# Patient Record
Sex: FEMALE | Race: BLACK OR AFRICAN AMERICAN | NOT HISPANIC OR LATINO | URBAN - METROPOLITAN AREA
[De-identification: names, ages, dates, MRNs, and addresses within clinical notes are randomized per-mention and may not be internally consistent; named-entity substitution may affect disease eponyms.]

---

## 2017-11-15 ENCOUNTER — CLINICAL SUPPORT (OUTPATIENT)
Dept: OTHER | Facility: CLINIC | Age: 32
End: 2017-11-15
Payer: COMMERCIAL

## 2017-11-15 VITALS
HEIGHT: 68 IN | DIASTOLIC BLOOD PRESSURE: 99 MMHG | WEIGHT: 262.63 LBS | BODY MASS INDEX: 39.8 KG/M2 | SYSTOLIC BLOOD PRESSURE: 160 MMHG

## 2017-11-15 DIAGNOSIS — Z00.8 HEALTH EXAMINATION IN POPULATION SURVEYS: Primary | ICD-10-CM

## 2017-11-15 LAB
GLUCOSE SERPL-MCNC: NORMAL MG/DL (ref 60–140)
POC CHOLESTEROL, HDL: 65 MG/DL (ref 40–?)
POC CHOLESTEROL, LDL: 95 MG/DL (ref ?–160)
POC CHOLESTEROL, TOTAL: 179 MG/DL (ref ?–240)
POC GLUCOSE FASTING: 92 MG/DL (ref 60–110)
POC TOTAL CHOLESTEROL / HDL RATIO: 2.75 (ref ?–6)
POC TRIGLYCERIDES: 91 MG/DL (ref ?–160)

## 2017-11-15 PROCEDURE — 80061 LIPID PANEL: CPT | Mod: QW,S$GLB,, | Performed by: INTERNAL MEDICINE

## 2017-11-15 PROCEDURE — 99401 PREV MED CNSL INDIV APPRX 15: CPT | Mod: S$GLB,,, | Performed by: INTERNAL MEDICINE

## 2017-11-15 PROCEDURE — 82947 ASSAY GLUCOSE BLOOD QUANT: CPT | Mod: QW,S$GLB,, | Performed by: INTERNAL MEDICINE

## 2017-11-16 ENCOUNTER — OFFICE VISIT (OUTPATIENT)
Dept: INTERNAL MEDICINE | Facility: CLINIC | Age: 32
End: 2017-11-16
Payer: COMMERCIAL

## 2017-11-16 VITALS
HEIGHT: 68 IN | BODY MASS INDEX: 40.05 KG/M2 | HEART RATE: 72 BPM | DIASTOLIC BLOOD PRESSURE: 120 MMHG | RESPIRATION RATE: 20 BRPM | TEMPERATURE: 98 F | SYSTOLIC BLOOD PRESSURE: 156 MMHG | WEIGHT: 264.25 LBS

## 2017-11-16 DIAGNOSIS — E66.01 MORBID OBESITY: ICD-10-CM

## 2017-11-16 DIAGNOSIS — I10 HYPERTENSION, UNSPECIFIED TYPE: Primary | ICD-10-CM

## 2017-11-16 LAB
BILIRUB SERPL-MCNC: NORMAL MG/DL
BLOOD URINE, POC: NORMAL
COLOR, POC UA: YELLOW
GLUCOSE UR QL STRIP: NORMAL
KETONES UR QL STRIP: NORMAL
LEUKOCYTE ESTERASE URINE, POC: NORMAL
NITRITE, POC UA: NORMAL
PH, POC UA: 7
PROTEIN, POC: NORMAL
SPECIFIC GRAVITY, POC UA: 1.01
UROBILINOGEN, POC UA: 0.2

## 2017-11-16 PROCEDURE — 81002 URINALYSIS NONAUTO W/O SCOPE: CPT | Mod: S$GLB,,, | Performed by: INTERNAL MEDICINE

## 2017-11-16 PROCEDURE — 99999 PR PBB SHADOW E&M-EST. PATIENT-LVL III: CPT | Mod: PBBFAC,,, | Performed by: INTERNAL MEDICINE

## 2017-11-16 PROCEDURE — 93010 ELECTROCARDIOGRAM REPORT: CPT | Mod: S$GLB,,, | Performed by: INTERNAL MEDICINE

## 2017-11-16 PROCEDURE — 99203 OFFICE O/P NEW LOW 30 MIN: CPT | Mod: 25,S$GLB,, | Performed by: INTERNAL MEDICINE

## 2017-11-16 PROCEDURE — 93005 ELECTROCARDIOGRAM TRACING: CPT | Mod: S$GLB,,, | Performed by: INTERNAL MEDICINE

## 2017-11-16 RX ORDER — VALSARTAN AND HYDROCHLOROTHIAZIDE 320; 25 MG/1; MG/1
1 TABLET, FILM COATED ORAL DAILY
Qty: 90 TABLET | Refills: 3 | Status: SHIPPED | OUTPATIENT
Start: 2017-11-16 | End: 2018-08-23

## 2017-11-16 RX ORDER — ACETAMINOPHEN 500 MG
1 TABLET ORAL DAILY
Qty: 1 EACH | Refills: 0 | Status: SHIPPED | OUTPATIENT
Start: 2017-11-16

## 2017-11-16 NOTE — PATIENT INSTRUCTIONS
Taking Your Blood Pressure  Blood pressure is the force of blood against the artery wall as it moves from the heart through the blood vessels. You can take your own blood pressure reading using a digital monitor. Take your readings the same each time, using the same arm. Take readings as often as your healthcare provider instructs.  About blood pressure monitors  Blood pressure monitors are designed for certain ages and cases. You can find monitors for older adults, for pregnant women, and for children. Make sure the one you choose is the right one for your age and situation.  The American Heart Association recommends an automatic cuff monitor that fits on your upper arm (bicep). The cuff should fit your arm size. A cuff thats too large or too small will not give an accurate reading. Measure around your upper arm to find your size.  Monitors that attach to your finger or wrist are not as accurate as monitors for your upper arm.  Ask your healthcare provider for help in choosing a monitor. Bring your monitor to your next provider visit if you need help in using it the correct way.  The steps below are general instructions for using an automatic digital monitor.  Step 1. Relax    · Take your blood pressure at the same time every day, such as in the morning or evening, or at the time your healthcare provider recommends.  · Wait at least a half-hour after smoking, eating, or exercising. Don't drink coffee, tea, soda, or other caffeinated beverages before checking your blood pressure.  · Sit comfortably at a table with both feet on the floor. Do not cross your legs or feet. Place the monitor near you.  · Rest for a few minutes before you begin.  Step 2. Wrap the cuff    · Place your arm on the table, palm up. Your arm should be at the level of your heart. Wrap the cuff around your upper arm, just above your elbow. Its best done on bare skin, not over clothing. Most cuffs will indicate where the brachial artery (the  blood vessel in the middle of the arm at the inner side of the elbow) should line up with the cuff. Look in your monitor's instruction booklet for an illustration. You can also bring your cuff to your healthcare provider and have them show you how to correctly place the cuff.  Step 3. Inflate the cuff    · Push the button that starts the pump.  · The cuff will tighten, then loosen.  · The numbers will change. When they stop changing, your blood pressure reading will appear.  · Take 2 or 3 readings one minute apart.  Step 4. Write down the results of each reading    · Write down your blood pressure numbers for each reading. Note the date and time. Keep your results in one place, such as a notebook. Even if your monitor has a built-in memory, keep a hard copy of the readings.  · Remove the cuff from your arm. Turn off the machine.  · Bring your blood pressure records with your healthcare providers at each visit.  · If you start a new blood pressure medicine, note the day you started the new medicine. Also note the day if you change the dose of your medicine. This information goes on your blood pressure recording sheet. This will help your healthcare provider monitor how well the medicine changes are working.  · Ask your healthcare provider what numbers should prompt you to call him or her. Also ask what numbers should prompt you to get help right away.  Date Last Reviewed: 11/1/2016 © 2000-2017 [employer]. [address] [place] [address]. All rights reserved. This information is not intended as a substitute for professional medical care. Always follow your healthcare professional's instructions.        Exercise for a Healthier Heart  You may wonder how you can improve the health of your heart. If youre thinking about exercise, youre on the right track. You dont need to become an athlete, but you do need a certain amount of brisk exercise to help strengthen your heart. If you have been  diagnosed with a heart condition, your doctor may recommend exercise to help stabilize your condition. To help make exercise a habit, choose safe, fun activities.     Exercise with a friend. When activity is fun, you're more likely to stick with it.     Be sure to check with your healthcare provider before starting an exercise program.   Why exercise?  Exercising regularly offers many healthy rewards. It can help you do all of the following:  · Improve your blood cholesterol level to help prevent further heart trouble  · Lower your blood pressure to help prevent a stroke or heart attack  · Control diabetes, or reduce your risk of getting this disease  · Improve your heart and lung function  · Reach and maintain a healthy weight  · Make your muscles stronger and more limber so you can stay active  · Prevent falls and fractures by slowing the loss of bone mass (osteoporosis)  · Manage stress better  · Reduce your blood pressure  · Improve your sense of self and your body image  Exercise tips  Ease into your routine. Set small goals. Then build on them.  Exercise on most days. Aim for a total of 150 or more minutes of moderate to  vigorous intensity activity each week. Consider 40 minutes, 3 to 4 times a week. For best results, activity should last for 40 minutes on average. It is OK to work up to the 40 minute period over time. Examples of moderate-intensity activity is walking 1 mile in 15 minutes or 30 to 45 minutes of yard work.  Step up your daily activity level. Along with your exercise program, try being more active throughout the day. Walk instead of drive. Do more household tasks or yard work.  Choose one or more activities you enjoy. Walking is one of the easiest things you can do. You can also try swimming, riding a bike, dancing, or taking an exercise class.  Stop exercising and call your doctor if you:  · Have chest pain or feel dizzy or lightheaded  · Feel burning, tightness, pressure, or heaviness in  your chest, neck, shoulders, back, or arms  · Have unusual shortness of breath  · Have increased joint or muscle pain  · Have palpitations or an irregular heartbeat   Date Last Reviewed: 5/1/2016  © 3127-9440 [employer]. [address] [place] [address], [place] [address]. All rights reserved. This information is not intended as a substitute for professional medical care. Always follow your healthcare professional's instructions.        Low-Salt Diet  This diet removes foods that are high in salt. It also limits the amount of salt you use when cooking. It is most often used for people with high blood pressure, edema (fluid retention), and kidney, liver, or heart disease.  Table salt contains the mineral sodium. Your body needs sodium to work normally. But too much sodium can make your health problems worse. Your healthcare provider is recommending a low-salt (also called low-sodium) diet for you. Your total daily allowance of salt is 1,500 to 2,300 milligrams (mg). It is less than 1 teaspoon of table salt. This means you can have only about 500 to 700 mg of sodium at each meal. People with certain health problems should limit salt intake to the lower end of the recommended range.    When you cook, dont add much salt. If you can cook without using salt, even better. Dont add salt to your food at the table.  When shopping, read food labels. Salt is often called sodium on the label. Choose foods that are salt-free, low salt, or very low salt. Note that foods with reduced salt may not lower your salt intake enough.    Beans, potatoes, and pasta  Ok: Dry beans, split peas, lentils, potatoes, rice, macaroni, pasta, spaghetti without added salt  Avoid: Potato chips, tortilla chips, and similar products  Breads and cereals  Ok: Low-sodium breads, rolls, cereals, and cakes; low-salt crackers, matzo crackers  Avoid: Salted crackers, pretzels, popcorn, Hungarian toast, pancakes, muffins  Dairy  Ok: Milk, chocolate milk,  hot chocolate mix, low-salt cheeses, and yogurt  Avoid: Processed cheese and cheese spreads; Roquefort, Camembert, and cottage cheese; buttermilk, instant breakfast drink  Desserts  Ok: Ice cream, frozen yogurt, juice bars, gelatin, cookies and pies, sugar, honey, jelly, hard candy  Avoid: Most pies, cakes and cookies prepared or processed with salt; instant pudding  Drinks  Ok: Tea, coffee, fizzy (carbonated) drinks, juices  Avoid: Flavored coffees, electrolyte replacement drinks, sports drinks  Meats  Ok: All fresh meat, fish, poultry, low-salt tuna, eggs, egg substitute  Avoid: Smoked, pickled, brine-cured, or salted meats and fish. This includes andrews, chipped beef, corned beef, hot dogs, deli meats, ham, kosher meats, salt pork, sausage, canned tuna, salted codfish, smoked salmon, herring, sardines, or anchovies.  Seasonings and spices  Ok: Most seasonings are okay. Good substitutes for salt include: fresh herb blends, hot sauce, lemon, garlic, hawthorne, vinegar, dry mustard, parsley, cilantro, horseradish, tomato paste, regular margarine, mayonnaise, unsalted butter, cream cheese, vegetable oil, cream, low-salt salad dressing and gravy.  Avoid: Regular ketchup, relishes, pickles, soy sauce, teriyaki sauce, Worcestershire sauce, BBQ sauce, tartar sauce, meat tenderizer, chili sauce, regular gravy, regular salad dressing, salted butter  Soups  Ok: Low-salt soups and broths made with allowed foods  Avoid: Bouillon cubes, soups with smoked or salted meats, regular soup and broth  Vegetables  Ok: Most vegetables are okay; also low-salt tomato and vegetable juices  Avoid: Sauerkraut and other brine-soaked vegetables; pickles and other pickled vegetables; tomato juice, olives  Date Last Reviewed: 8/1/2016 © 2000-2017 [employer]. [address] [place] [address]. All rights reserved. This information is not intended as a substitute for professional medical care. Always follow your healthcare  professional's instructions.

## 2017-11-16 NOTE — PROGRESS NOTES
Subjective:       Patient ID: Samantha Boston is a 32 y.o. female.    Chief Complaint: Establish Care; Hypertension (x 1year); and Headache    HPI  Pt establishing care.  Pt with know HTN x 1 year, found to be 160/100 yesterday at a health fair.  Pt with long-term HA.  No visual problems.  No CP, SOB.  Admits to a high Na diet.  Exercises 3 days/week.  Pt with a normal sugar and a favorable cholesterol panel yesterday.  Review of Systems   All other systems reviewed and are negative.      Objective:      Physical Exam   Constitutional: She appears well-developed. No distress.   obese   HENT:   Head: Normocephalic.   Eyes: EOM are normal.   Neck: Normal range of motion. No tracheal deviation present.   Cardiovascular: Normal rate, regular rhythm and intact distal pulses.  Exam reveals gallop. Friction rub: S4.    Pulmonary/Chest: Effort normal and breath sounds normal. No respiratory distress.   Abdominal: Soft. Bowel sounds are normal. She exhibits no distension. There is no tenderness.   Musculoskeletal: Normal range of motion. She exhibits no edema.   Neurological: She is alert. No cranial nerve deficit. She exhibits normal muscle tone. Coordination normal.   Skin: Skin is warm and dry. No rash noted. She is not diaphoretic. No erythema.   Psychiatric: She has a normal mood and affect. Her behavior is normal.   Vitals reviewed.      Assessment:       1. Hypertension, unspecified type    2. Morbid obesity        Plan:       Samantha was seen today for establish care, hypertension and headache.    Diagnoses and all orders for this visit:    Hypertension, unspecified type  -     POCT urine dipstick without microscope  -     EKG 12-lead; Future  -     blood pressure test kit-large Kit; 1 Units by Misc.(Non-Drug; Combo Route) route once daily.  -     valsartan-hydrochlorothiazide (DIOVAN-HCT) 320-25 mg per tablet; Take 1 tablet by mouth once daily.    Morbid obesity      Return in about 2 weeks (around 11/30/2017).

## 2017-11-20 ENCOUNTER — TELEPHONE (OUTPATIENT)
Dept: INTERNAL MEDICINE | Facility: CLINIC | Age: 32
End: 2017-11-20

## 2017-11-20 ENCOUNTER — PATIENT MESSAGE (OUTPATIENT)
Dept: INTERNAL MEDICINE | Facility: CLINIC | Age: 32
End: 2017-11-20

## 2017-11-20 DIAGNOSIS — Z00.00 ROUTINE GENERAL MEDICAL EXAMINATION AT A HEALTH CARE FACILITY: Primary | ICD-10-CM

## 2017-11-20 NOTE — TELEPHONE ENCOUNTER
----- Message from Cele Phillips sent at 11/17/2017 12:21 PM CST -----   Patient said she had a rough night couldn't sleep because her back and leg was hurting and she was also feeling dizzy. Would like to get blood work orders due to her symptoms.

## 2017-11-29 NOTE — PROGRESS NOTES
Reviewed results of biometric screening per RODRIGO Lin RN. Would like to obtain PCP. Asymptomatic. Pt states she has been running high BP. Instructed to go to ER if symptomatic. Pt verbalized understanding.

## 2018-01-19 ENCOUNTER — TELEPHONE (OUTPATIENT)
Dept: OTHER | Facility: CLINIC | Age: 33
End: 2018-01-19

## 2018-08-14 ENCOUNTER — TELEPHONE (OUTPATIENT)
Dept: OPTOMETRY | Facility: CLINIC | Age: 33
End: 2018-08-14

## 2018-08-14 NOTE — TELEPHONE ENCOUNTER
----- Message from Meeta Adair sent at 8/14/2018  8:48 AM CDT -----  Contact: Samantha Boston   Pt would like to speak with  nurse to rescheduled the appointment please something sooner ,pt cn be reached at [phone] please thank you.

## 2018-08-23 ENCOUNTER — OFFICE VISIT (OUTPATIENT)
Dept: OPTOMETRY | Facility: CLINIC | Age: 33
End: 2018-08-23
Payer: COMMERCIAL

## 2018-08-23 DIAGNOSIS — Z46.0 FITTING AND ADJUSTMENT OF SPECTACLES AND CONTACT LENSES: Primary | ICD-10-CM

## 2018-08-23 DIAGNOSIS — H52.13 MYOPIA OF BOTH EYES: Primary | ICD-10-CM

## 2018-08-23 PROCEDURE — 92015 DETERMINE REFRACTIVE STATE: CPT | Mod: S$GLB,,, | Performed by: OPTOMETRIST

## 2018-08-23 PROCEDURE — 92310 CONTACT LENS FITTING OU: CPT | Mod: ,,, | Performed by: OPTOMETRIST

## 2018-08-23 PROCEDURE — 92004 COMPRE OPH EXAM NEW PT 1/>: CPT | Mod: S$GLB,,, | Performed by: OPTOMETRIST

## 2018-08-23 PROCEDURE — 99999 PR PBB SHADOW E&M-EST. PATIENT-LVL I: CPT | Mod: PBBFAC,,, | Performed by: OPTOMETRIST

## 2018-08-23 PROCEDURE — 99999 PR PBB SHADOW E&M-EST. PATIENT-LVL II: CPT | Mod: PBBFAC,,, | Performed by: OPTOMETRIST

## 2018-08-23 RX ORDER — NORGESTIMATE AND ETHINYL ESTRADIOL 0.25-0.035
KIT ORAL
COMMUNITY

## 2018-08-23 RX ORDER — AMLODIPINE BESYLATE 5 MG/1
TABLET ORAL
COMMUNITY
Start: 2018-07-17

## 2018-08-23 RX ORDER — OLMESARTAN MEDOXOMIL AND HYDROCHLOROTHIAZIDE 20/12.5 20; 12.5 MG/1; MG/1
TABLET ORAL
COMMUNITY
Start: 2018-08-16

## 2018-08-23 NOTE — PROGRESS NOTES
HPI     GINO: 1 year ago  Pt states glasses are too strong when on the computer, does not wear   glasses when on the computer. Has not worn CL in 2 years. Wore OASYS in   the past  Denies f/f    No gtts     Wear WILMA SCL       Last edited by Shola Bell, OD on 8/23/2018  3:02 PM. (History)        ROS     Negative for: Constitutional, Gastrointestinal, Neurological, Skin,   Genitourinary, Musculoskeletal, HENT, Endocrine, Cardiovascular, Eyes,   Respiratory, Psychiatric, Allergic/Imm, Heme/Lymph    Last edited by Shola Bell, OD on 8/23/2018  2:38 PM. (History)        Assessment /Plan     For exam results, see Encounter Report.    Myopia of both eyes      1. Good fit/VA w OASYS CLs (slight reduced VA OS 2 to uncorrected cyl)  2. Wrote new spex Rx.  Pt works all day on computer, and discussed it might be best to not wear spex or Cls at work (since slightly nearsighted)  3. Discussed CRIZAL for glare    PLAN:    1. Wrote spex/CLRx  2. Continue Daily Wear schedule.  NO SLEEPING IN CONTACT LENSES. Clean and disinfect nightly.  exchange monthly.    3. Advised REFRESH PLUS ATs BID+ since on computer most of the day  4. rtc 1 yr

## 2018-11-07 ENCOUNTER — CLINICAL SUPPORT (OUTPATIENT)
Dept: OTHER | Facility: CLINIC | Age: 33
End: 2018-11-07
Payer: COMMERCIAL

## 2018-11-07 DIAGNOSIS — Z00.8 ENCOUNTER FOR OTHER GENERAL EXAMINATION: ICD-10-CM

## 2018-11-07 PROCEDURE — 80061 LIPID PANEL: CPT | Mod: QW,S$GLB,, | Performed by: INTERNAL MEDICINE

## 2018-11-07 PROCEDURE — 82947 ASSAY GLUCOSE BLOOD QUANT: CPT | Mod: QW,S$GLB,, | Performed by: INTERNAL MEDICINE

## 2018-11-07 PROCEDURE — 99401 PREV MED CNSL INDIV APPRX 15: CPT | Mod: S$GLB,,, | Performed by: INTERNAL MEDICINE

## 2018-11-08 VITALS — HEIGHT: 67 IN | BODY MASS INDEX: 42.01 KG/M2

## 2018-11-08 LAB
HDLC SERPL-MCNC: 61 MG/DL
POC CHOLESTEROL, LDL (DOCK): 113 MG/DL
POC CHOLESTEROL, TOTAL: 187 MG/DL
POC GLUCOSE, FASTING: 97 MG/DL
TRIGL SERPL-MCNC: 68 MG/DL

## 2019-08-05 ENCOUNTER — PROCEDURE VISIT (OUTPATIENT)
Dept: OPHTHALMOLOGY | Facility: CLINIC | Age: 34
End: 2019-08-05
Payer: COMMERCIAL

## 2019-08-05 DIAGNOSIS — H00.11 CHALAZION OF RIGHT UPPER EYELID: Primary | ICD-10-CM

## 2019-08-05 DIAGNOSIS — H52.7 REFRACTIVE ERROR: ICD-10-CM

## 2019-08-05 PROCEDURE — 92012 INTRM OPH EXAM EST PATIENT: CPT | Mod: S$GLB,,, | Performed by: OPHTHALMOLOGY

## 2019-08-05 PROCEDURE — 92012 PR EYE EXAM, EST PATIENT,INTERMED: ICD-10-PCS | Mod: S$GLB,,, | Performed by: OPHTHALMOLOGY

## 2019-08-05 RX ORDER — NIFEDIPINE 30 MG/1
TABLET, EXTENDED RELEASE ORAL
COMMUNITY
Start: 2019-07-18

## 2019-08-05 NOTE — PROGRESS NOTES
Subjective:       Patient ID: Samantha Boston is a 34 y.o. female.    Chief Complaint: Chalazion    HPI     DSL- 8/23/18 Dr. Bell    35 y/o female is here for Chalazion removal. Pt states Chalazion has   decreased since last visit. Pt is 29 weeks pregnant. Pt have not tried any   treatment expect for warm compress. Pt denies VA change.     Eyemeds  No gtts    Last edited by Antonio Dykes on 8/5/2019  3:52 PM. (History)             Assessment:       1. Chalazion of right upper eyelid    2. Refractive error        Plan:       RUL chalazion-Pt wants to try med tx.  RE-Doing well.      Start WC's & Maxitrol shauna bid to RUL x 10-14 days.  RTC me prn for excision.

## 2020-10-05 ENCOUNTER — PATIENT MESSAGE (OUTPATIENT)
Dept: INTERNAL MEDICINE | Facility: CLINIC | Age: 35
End: 2020-10-05

## 2021-04-14 ENCOUNTER — OFFICE VISIT (OUTPATIENT)
Dept: OPTOMETRY | Facility: CLINIC | Age: 36
End: 2021-04-14
Payer: MEDICAID

## 2021-04-14 ENCOUNTER — TELEPHONE (OUTPATIENT)
Dept: OPTOMETRY | Facility: CLINIC | Age: 36
End: 2021-04-14

## 2021-04-14 DIAGNOSIS — H52.13 MYOPIA OF BOTH EYES: Primary | ICD-10-CM

## 2021-04-14 DIAGNOSIS — Z46.0 FITTING AND ADJUSTMENT OF SPECTACLES AND CONTACT LENSES: Primary | ICD-10-CM

## 2021-04-14 DIAGNOSIS — Z97.3 WEARS CONTACT LENSES: ICD-10-CM

## 2021-04-14 PROCEDURE — 92015 DETERMINE REFRACTIVE STATE: CPT | Mod: ,,, | Performed by: OPTOMETRIST

## 2021-04-14 PROCEDURE — 99999 PR PBB SHADOW E&M-EST. PATIENT-LVL II: ICD-10-PCS | Mod: PBBFAC,,, | Performed by: OPTOMETRIST

## 2021-04-14 PROCEDURE — 99999 PR PBB SHADOW E&M-EST. PATIENT-LVL II: CPT | Mod: PBBFAC,,, | Performed by: OPTOMETRIST

## 2021-04-14 PROCEDURE — 92015 PR REFRACTION: ICD-10-PCS | Mod: ,,, | Performed by: OPTOMETRIST

## 2021-04-14 PROCEDURE — 92014 COMPRE OPH EXAM EST PT 1/>: CPT | Mod: S$PBB,,, | Performed by: OPTOMETRIST

## 2021-04-14 PROCEDURE — 92014 PR EYE EXAM, EST PATIENT,COMPREHESV: ICD-10-PCS | Mod: S$PBB,,, | Performed by: OPTOMETRIST

## 2021-04-14 PROCEDURE — 99212 OFFICE O/P EST SF 10 MIN: CPT | Mod: PBBFAC,PO | Performed by: OPTOMETRIST

## 2021-04-14 PROCEDURE — 92310 CONTACT LENS FITTING OU: CPT | Mod: ,,, | Performed by: OPTOMETRIST

## 2021-04-14 PROCEDURE — 92310 PR CONTACT LENS FITTING (NO CHANGE): ICD-10-PCS | Mod: ,,, | Performed by: OPTOMETRIST

## 2021-10-29 ENCOUNTER — PATIENT MESSAGE (OUTPATIENT)
Dept: OPTOMETRY | Facility: CLINIC | Age: 36
End: 2021-10-29
Payer: MEDICAID

## 2024-07-19 ENCOUNTER — HOSPITAL ENCOUNTER (EMERGENCY)
Facility: HOSPITAL | Age: 39
Discharge: HOME | End: 2024-07-19
Attending: STUDENT IN AN ORGANIZED HEALTH CARE EDUCATION/TRAINING PROGRAM
Payer: MEDICAID

## 2024-07-19 VITALS
OXYGEN SATURATION: 99 % | BODY MASS INDEX: 39.4 KG/M2 | SYSTOLIC BLOOD PRESSURE: 144 MMHG | HEART RATE: 69 BPM | WEIGHT: 260 LBS | HEIGHT: 68 IN | DIASTOLIC BLOOD PRESSURE: 87 MMHG | RESPIRATION RATE: 19 BRPM | TEMPERATURE: 98 F

## 2024-07-19 DIAGNOSIS — M54.32 SCIATICA OF LEFT SIDE: Primary | ICD-10-CM

## 2024-07-19 LAB
B-HCG UR QL: NEGATIVE
BILIRUB UR QL STRIP: NEGATIVE
CLARITY UR REFRACT.AUTO: CLEAR
COLOR UR AUTO: YELLOW
CTP QC/QA: YES
GLUCOSE UR QL STRIP: NEGATIVE
HGB UR QL STRIP: NEGATIVE
KETONES UR QL STRIP: ABNORMAL
LEUKOCYTE ESTERASE UR QL STRIP: NEGATIVE
NITRITE UR QL STRIP: NEGATIVE
PH UR STRIP: 7 [PH] (ref 5–8)
PROT UR QL STRIP: NEGATIVE
SP GR UR STRIP: 1.01 (ref 1–1.03)
URN SPEC COLLECT METH UR: ABNORMAL
UROBILINOGEN UR STRIP-ACNC: NEGATIVE EU/DL

## 2024-07-19 PROCEDURE — 81003 URINALYSIS AUTO W/O SCOPE: CPT | Mod: ER | Performed by: STUDENT IN AN ORGANIZED HEALTH CARE EDUCATION/TRAINING PROGRAM

## 2024-07-19 PROCEDURE — 81025 URINE PREGNANCY TEST: CPT | Mod: ER | Performed by: STUDENT IN AN ORGANIZED HEALTH CARE EDUCATION/TRAINING PROGRAM

## 2024-07-19 PROCEDURE — 99283 EMERGENCY DEPT VISIT LOW MDM: CPT | Mod: ER

## 2024-07-19 RX ORDER — KETOROLAC TROMETHAMINE 10 MG/1
10 TABLET, FILM COATED ORAL EVERY 6 HOURS
Qty: 20 TABLET | Refills: 0 | Status: SHIPPED | OUTPATIENT
Start: 2024-07-19 | End: 2024-07-24

## 2024-07-19 NOTE — ED PROVIDER NOTES
NAME:  Samantha Boston  CSN:     [id number]  MRN:    [id number]  ADMIT DATE: 7/19/2024        eMERGENCY dEPARTMENT eNCOUnter    CHIEF COMPLAINT    Chief Complaint   Patient presents with    Flank Pain     Pt C/O L flank pain with radiation to vagina X 5 days. Pt denies GI/.        HPI    Samantha Boston is a 39 y.o. female with a past medical history of  has no past medical history on file.     she presents to the ED due to pain to the left back starting 5 days ago.  Worse with certain movements.  States the 1st day she could barely change position secondary to pain.  Noted that 1st day she had pain with bowel movement and had to strain.  Notes bowel movements have been normal since that time.  Has been stretching feeling as if it is getting better.  States she would feel intermittent popping in the left hip, concerned she could have dislocated it or having some issue causing the pain.  Denies any urinary symptoms.  No vaginal discharge, however now pain is radiating into the vagina and feels as if something could be falling out almost.  Notes a pressure-like sensation.  Normal bowel movements.  Has been taking Tylenol without initially but does not seem to be working as well.  Has never had pain like this in the past.  No known injury.    HPI       PAST MEDICAL HISTORY  No past medical history on file.    SURGICAL HISTORY    Past Surgical History:   Procedure Laterality Date    TONSILLECTOMY         FAMILY HISTORY    Family History   Problem Relation Name Age of Onset    Cataracts Maternal Grandmother      Glaucoma Maternal Grandmother      Hypertension Mother      Amblyopia Neg Hx      Blindness Neg Hx      Cancer Neg Hx      Macular degeneration Neg Hx      Retinal detachment Neg Hx      Strabismus Neg Hx         SOCIAL HISTORY    Social History     Socioeconomic History    Marital status: Single   Tobacco Use    Smoking status: Never    Smokeless tobacco: Never   Substance and Sexual Activity    Alcohol use:  No    Drug use: No    Sexual activity: Yes     Social Determinants of Health     Financial Resource Strain: Low Risk  (6/24/2021)    Received from [facility]    Overall Financial Resource Strain (CARDIA)     Difficulty of Paying Living Expenses: Not hard at all   Food Insecurity: No Food Insecurity (6/24/2021)    Received from [facility]    Hunger Vital Sign     Worried About Running Out of Food in the Last Year: Never true     Ran Out of Food in the Last Year: Never true   Transportation Needs: No Transportation Needs (6/24/2021)    Received from [facility]    PRAPARE - Transportation     Lack of Transportation (Medical): No     Lack of Transportation (Non-Medical): No   Physical Activity: Inactive (6/8/2021)    Received from [facility]    Exercise Vital Sign     Days of Exercise per Week: 0 days     Minutes of Exercise per Session: 0 min   Stress: Stress Concern Present (6/8/2021)    Received from [facility]    [employer] - Occupational Stress Questionnaire     Feeling of Stress : To some extent       MEDICATIONS  Current Outpatient Medications   Medication Instructions    amLODIPine (NORVASC) 5 MG tablet No dose, route, or frequency recorded.    blood pressure test kit-large Kit 1 Units, Misc.(Non-Drug; Combo Route), Daily    ketorolac (TORADOL) 10 mg, Oral, Every 6 hours    NIFEdipine (PROCARDIA-XL) 30 MG (OSM) 24 hr tablet No dose, route, or frequency recorded.    norgestimate-ethinyl estradiol (MONONESSA, 28,) 0.25-35 mg-mcg per tablet Mononessa (28) 0.25 mg-35 mcg tablet    olmesartan-hydrochlorothiazide (BENICAR HCT) 20-12.5 mg per tablet No dose, route, or frequency recorded.       ALLERGIES    Review of patient's allergies indicates:  No Known Allergies      REVIEW OF SYSTEMS   Review of Systems       PHYSICAL EXAM    Reviewed Triage Note    VITAL SIGNS:   ED Triage Vitals [07/19/24 1601]   Enc Vitals Group      BP (!) 144/87      Pulse 69      Resp 19      Temp 97.9 °F  "(36.6 °C)      Temp Source Oral      SpO2 99 %      Weight 260 lb      Height 5' 8"      Head Circumference       Peak Flow       Pain Score       Pain Loc       Pain Education       Exclude from Growth Chart        Patient Vitals for the past 24 hrs:   BP Temp Temp src Pulse Resp SpO2 Height Weight   07/19/24 1601 (!) 144/87 97.9 °F (36.6 °C) Oral 69 19 99 % 5' 8" (1.727 m) 117.9 kg (260 lb)       Physical Exam    Nursing note and vitals reviewed.  Constitutional: She appears well-developed and well-nourished.   HENT:   Head: Normocephalic and atraumatic.   Eyes: EOM are normal. Pupils are equal, round, and reactive to light.   Neck: Neck supple.   Normal range of motion.  Cardiovascular:  Normal rate and regular rhythm.           Pulmonary/Chest: Breath sounds normal. No respiratory distress.   Abdominal: Abdomen is soft. There is no abdominal tenderness. Hernia confirmed negative in the right inguinal area and confirmed negative in the left inguinal area.   Genitourinary:    Vagina normal.   Cervix exhibits no motion tenderness, no discharge and no friability.    No vaginal discharge.      Genitourinary Comments: No evidence of prolapse.  No in the vaginal canal appreciated.     Musculoskeletal:         General: Normal range of motion.      Cervical back: Normal range of motion and neck supple.      Comments: Reproducible tenderness to palpation to the left lower lumbar spine.     Neurological: She is alert and oriented to person, place, and time.   Skin: Skin is warm and dry.   Psychiatric: She has a normal mood and affect.          EKG     Interpreted by EM physician if performed:               LABS  Pertinent labs reviewed. (See chart for details)   Labs Reviewed   URINALYSIS, REFLEX TO URINE CULTURE - Abnormal       Result Value    Specimen UA Urine, Clean Catch      Color, UA Yellow      Appearance, UA Clear      pH, UA 7.0      Specific Gravity, UA 1.015      Protein, UA Negative      Glucose, UA Negative   "    Ketones, UA 1+ (*)     Bilirubin (UA) Negative      Occult Blood UA Negative      Nitrite, UA Negative      Urobilinogen, UA Negative      Leukocytes, UA Negative      Narrative:     Preferred Collection Type->Urine, Clean Catch  Specimen Source->Urine   POCT URINE PREGNANCY    POC Preg Test, Ur Negative       Acceptable Yes           RADIOLOGY          Imaging Results    None           PROCEDURES    Procedures      ED COURSE & MEDICAL DECISION MAKING    Pertinent Labs & Imaging studies reviewed. (See chart for details and specific orders.)          Summary of review of records:   Last seen by primary care in June of 2024.  Routine screening at that time.    Medical Decision Making  Amount and/or Complexity of Data Reviewed  Labs: ordered. Decision-making details documented in ED Course.      Samantha Boston is a 39 y.o. female presents with 5 days of left lower back pain worse with certain movements now with radiation into the groin region.  No known injury.    Differential includes but is not limited to UTI, pregnancy constipation, prolapse, considered but doubt endometriosis or intra-abdominal.          Medications - No data to display    ED Course as of 07/19/24 1759   Fri Jul 19, 2024   1631 Urinalysis, Reflex to Urine Culture Urine, Clean Catch(!)  No e/o infection [HL]   1631 hCG Qualitative, Urine: Negative [HL]      ED Course User Index  [HL] Betty Vega,        No acute emergent medical condition has been identified. The patient appears to be low risk for an emergent medical condition is appropriate for discharge with outpatient f/u as detailed in discharge instructions for reevaluation and possible continued outpatient workup and management. I have discussed the workup with the patient, who has verbalized understanding of the plan and need for outpatient follow-up.  This evaluation does not preclude the development of an emergent condition so in addition, I have advised the  patient that they can return to the ED at any time with worsening or change of their symptoms, or with any other medical complaint.         FINAL IMPRESSION    Final diagnoses:  [M54.32] Sciatica of left side (Primary)       DISPOSITION  Patient discharged in stable condition        ED Prescriptions       Medication Sig Dispense Start Date End Date Auth. Provider    ketorolac (TORADOL) 10 mg tablet Take 1 tablet (10 mg total) by mouth every 6 (six) hours. for 5 days 20 tablet 7/19/2024 7/24/2024 Betty Vega DO          Follow-up Information       Follow up With Specialties Details Why Contact Info    Sandor Funez MD Internal Medicine Schedule an appointment as soon as possible for a visit in 1 week  [address] [place] [address] [address]  [facility] [id number] [phone]      [facility] - Emergency Dept Emergency Medicine  If symptoms worsen, As needed [address] [facility]  Emergency Department  [facility] [address] [phone]              DISCLAIMER: This note was prepared with M*modal voice recognition transcription software. Garbled syntax, mangled pronouns, and other bizarre constructions may be attributed to that software system.             Betty Vega DO  07/19/24 [id number]

## 2024-07-19 NOTE — DISCHARGE INSTRUCTIONS
These medications are available over the counter to help with musculoskeletal pain:   You can take tylenol 500 mg every 6 hours.   You can use over the counter Salonpas LIDOCAINE 4% Pain Relieving Gel-Patch or something similar that contains lidocaine.

## 2025-06-18 ENCOUNTER — HOSPITAL ENCOUNTER (INPATIENT)
Facility: HOSPITAL | Age: 40
LOS: 3 days | Discharge: HOME | DRG: 769 | End: 2025-06-22
Attending: STUDENT IN AN ORGANIZED HEALTH CARE EDUCATION/TRAINING PROGRAM | Admitting: HOSPITALIST
Payer: COMMERCIAL

## 2025-06-18 DIAGNOSIS — R00.0 TACHYCARDIA: ICD-10-CM

## 2025-06-18 DIAGNOSIS — Z13.6 SCREENING FOR CARDIOVASCULAR CONDITION: ICD-10-CM

## 2025-06-18 DIAGNOSIS — N39.0 URINARY TRACT INFECTION WITHOUT HEMATURIA, SITE UNSPECIFIED: Primary | ICD-10-CM

## 2025-06-18 DIAGNOSIS — R07.9 CHEST PAIN: ICD-10-CM

## 2025-06-18 DIAGNOSIS — A41.9 SEPSIS, DUE TO UNSPECIFIED ORGANISM, UNSPECIFIED WHETHER ACUTE ORGAN DYSFUNCTION PRESENT: ICD-10-CM

## 2025-06-18 PROCEDURE — 96365 THER/PROPH/DIAG IV INF INIT: CPT | Mod: 59,ER

## 2025-06-18 PROCEDURE — 96361 HYDRATE IV INFUSION ADD-ON: CPT | Mod: ER

## 2025-06-18 PROCEDURE — 96366 THER/PROPH/DIAG IV INF ADDON: CPT | Mod: ER

## 2025-06-18 PROCEDURE — 99285 EMERGENCY DEPT VISIT HI MDM: CPT | Mod: ER,25

## 2025-06-19 ENCOUNTER — ANESTHESIA EVENT (OUTPATIENT)
Dept: SURGERY | Facility: HOSPITAL | Age: 40
End: 2025-06-19
Payer: COMMERCIAL

## 2025-06-19 ENCOUNTER — ANESTHESIA (OUTPATIENT)
Dept: SURGERY | Facility: HOSPITAL | Age: 40
End: 2025-06-19
Payer: COMMERCIAL

## 2025-06-19 PROBLEM — D62 ACUTE BLOOD LOSS ANEMIA: Status: ACTIVE | Noted: 2025-06-19

## 2025-06-19 PROBLEM — E87.6 HYPOKALEMIA: Status: ACTIVE | Noted: 2025-06-19

## 2025-06-19 PROBLEM — E83.42 HYPOMAGNESEMIA: Status: ACTIVE | Noted: 2025-06-19

## 2025-06-19 PROBLEM — A41.9 SEPSIS: Status: ACTIVE | Noted: 2025-06-19

## 2025-06-19 PROBLEM — O14.93 PRE-ECLAMPSIA IN THIRD TRIMESTER: Status: ACTIVE | Noted: 2025-06-19

## 2025-06-19 LAB
ABSOLUTE EOSINOPHIL (OHS): 0 K/UL
ABSOLUTE EOSINOPHIL (OHS): 0 K/UL
ABSOLUTE MONOCYTE (OHS): 0.04 K/UL (ref 0.3–1)
ABSOLUTE MONOCYTE (OHS): 0.3 K/UL (ref 0.3–1)
ABSOLUTE NEUTROPHIL COUNT (OHS): 4.24 K/UL (ref 1.8–7.7)
ABSOLUTE NEUTROPHIL COUNT (OHS): 9.85 K/UL (ref 1.8–7.7)
ALBUMIN SERPL BCP-MCNC: 4.1 G/DL (ref 3.5–5.2)
ALP SERPL-CCNC: 144 UNIT/L (ref 38–126)
ALT SERPL W/O P-5'-P-CCNC: 26 UNIT/L (ref 10–44)
ANION GAP (OHS): 12 MMOL/L (ref 8–16)
ANION GAP (OHS): 15 MMOL/L (ref 8–16)
AST SERPL-CCNC: 22 UNIT/L (ref 15–46)
BACTERIA #/AREA URNS AUTO: ABNORMAL /HPF
BASOPHILS # BLD AUTO: 0.01 K/UL
BASOPHILS # BLD AUTO: 0.03 K/UL
BASOPHILS NFR BLD AUTO: 0.2 %
BASOPHILS NFR BLD AUTO: 0.3 %
BILIRUB SERPL-MCNC: 0.4 MG/DL (ref 0.1–1)
BILIRUB UR QL STRIP.AUTO: NEGATIVE
BILIRUB UR QL STRIP.AUTO: NEGATIVE
BUN SERPL-MCNC: 12 MG/DL (ref 6–20)
BUN SERPL-MCNC: 15 MG/DL (ref 7–17)
CALCIUM SERPL-MCNC: 8 MG/DL (ref 8.7–10.5)
CALCIUM SERPL-MCNC: 8.8 MG/DL (ref 8.7–10.5)
CHLORIDE SERPL-SCNC: 106 MMOL/L (ref 95–110)
CHLORIDE SERPL-SCNC: 109 MMOL/L (ref 95–110)
CLARITY UR: CLEAR
CLARITY UR: CLEAR
CO2 SERPL-SCNC: 18 MMOL/L (ref 23–29)
CO2 SERPL-SCNC: 19 MMOL/L (ref 23–29)
COLOR UR AUTO: YELLOW
COLOR UR AUTO: YELLOW
CREAT SERPL-MCNC: 0.8 MG/DL (ref 0.5–1.4)
CREAT SERPL-MCNC: 0.9 MG/DL (ref 0.5–1.4)
ERYTHROCYTE [DISTWIDTH] IN BLOOD BY AUTOMATED COUNT: 14.4 % (ref 11.5–14.5)
ERYTHROCYTE [DISTWIDTH] IN BLOOD BY AUTOMATED COUNT: 15.1 % (ref 11.5–14.5)
GFR SERPLBLD CREATININE-BSD FMLA CKD-EPI: >60 ML/MIN/1.73/M2
GFR SERPLBLD CREATININE-BSD FMLA CKD-EPI: >60 ML/MIN/1.73/M2
GLUCOSE SERPL-MCNC: 214 MG/DL (ref 70–110)
GLUCOSE SERPL-MCNC: 94 MG/DL (ref 70–110)
GLUCOSE UR QL STRIP: NEGATIVE
GLUCOSE UR QL STRIP: NEGATIVE
HCG INTACT+B SERPL-ACNC: 14.08 MIU/ML
HCG INTACT+B SERPL-ACNC: 17.51 MIU/ML
HCT VFR BLD AUTO: 35 % (ref 37–48.5)
HCT VFR BLD AUTO: 38.8 % (ref 37–48.5)
HGB BLD-MCNC: 11.4 GM/DL (ref 12–16)
HGB BLD-MCNC: 12.8 GM/DL (ref 12–16)
HGB UR QL STRIP: ABNORMAL
HGB UR QL STRIP: ABNORMAL
HOLD SPECIMEN: NORMAL
HOLD SPECIMEN: NORMAL
IMM GRANULOCYTES # BLD AUTO: 0.05 K/UL (ref 0–0.04)
IMM GRANULOCYTES # BLD AUTO: 0.08 K/UL (ref 0–0.04)
IMM GRANULOCYTES NFR BLD AUTO: 0.5 % (ref 0–0.5)
IMM GRANULOCYTES NFR BLD AUTO: 1.6 % (ref 0–0.5)
INFLUENZA A MOLECULAR (OHS): NEGATIVE
INFLUENZA B MOLECULAR (OHS): NEGATIVE
KETONES UR QL STRIP: ABNORMAL
KETONES UR QL STRIP: NEGATIVE
LACTATE SERPL-SCNC: 0.9 MMOL/L (ref 0.5–2.2)
LACTATE SERPL-SCNC: 4.1 MMOL/L (ref 0.5–2.2)
LEUKOCYTE ESTERASE UR QL STRIP: ABNORMAL
LEUKOCYTE ESTERASE UR QL STRIP: ABNORMAL
LIPASE SERPL-CCNC: 94 U/L (ref 23–300)
LYMPHOCYTES # BLD AUTO: 0.22 K/UL (ref 1–4.8)
LYMPHOCYTES # BLD AUTO: 0.58 K/UL (ref 1–4.8)
MAGNESIUM SERPL-MCNC: 1.7 MG/DL (ref 1.6–2.6)
MAGNESIUM SERPL-MCNC: 1.8 MG/DL (ref 1.6–2.6)
MCH RBC QN AUTO: 27.8 PG (ref 27–31)
MCH RBC QN AUTO: 28.4 PG (ref 27–31)
MCHC RBC AUTO-ENTMCNC: 32.6 G/DL (ref 32–36)
MCHC RBC AUTO-ENTMCNC: 33 G/DL (ref 32–36)
MCV RBC AUTO: 85 FL (ref 82–98)
MCV RBC AUTO: 86 FL (ref 82–98)
MICROSCOPIC COMMENT: ABNORMAL
MICROSCOPIC COMMENT: NORMAL
NITRITE UR QL STRIP: NEGATIVE
NITRITE UR QL STRIP: NEGATIVE
NUCLEATED RBC (/100WBC) (OHS): 0 /100 WBC
NUCLEATED RBC (/100WBC) (OHS): 0 /100 WBC
OHS QRS DURATION: 68 MS
OHS QTC CALCULATION: 480 MS
PH UR STRIP: 6 [PH]
PH UR STRIP: 6 [PH]
PHOSPHATE SERPL-MCNC: 4.1 MG/DL (ref 2.7–4.5)
PLATELET # BLD AUTO: 274 K/UL (ref 150–450)
PLATELET # BLD AUTO: 311 K/UL (ref 150–450)
PMV BLD AUTO: 9.2 FL (ref 9.2–12.9)
PMV BLD AUTO: 9.3 FL (ref 9.2–12.9)
POTASSIUM SERPL-SCNC: 3.3 MMOL/L (ref 3.5–5.1)
POTASSIUM SERPL-SCNC: 4.1 MMOL/L (ref 3.5–5.1)
PROCALCITONIN SERPL-MCNC: 0.96 NG/ML
PROT SERPL-MCNC: 7.5 GM/DL (ref 6–8.4)
PROT UR QL STRIP: ABNORMAL
PROT UR QL STRIP: NEGATIVE
RBC # BLD AUTO: 4.1 M/UL (ref 4–5.4)
RBC # BLD AUTO: 4.5 M/UL (ref 4–5.4)
RBC #/AREA URNS AUTO: 10 /HPF (ref 0–4)
RBC #/AREA URNS HPF: 1 /HPF (ref 0–4)
RELATIVE EOSINOPHIL (OHS): 0 %
RELATIVE EOSINOPHIL (OHS): 0 %
RELATIVE LYMPHOCYTE (OHS): 11.7 % (ref 18–48)
RELATIVE LYMPHOCYTE (OHS): 2.1 % (ref 18–48)
RELATIVE MONOCYTE (OHS): 0.8 % (ref 4–15)
RELATIVE MONOCYTE (OHS): 2.9 % (ref 4–15)
RELATIVE NEUTROPHIL (OHS): 85.7 % (ref 38–73)
RELATIVE NEUTROPHIL (OHS): 94.2 % (ref 38–73)
SARS-COV-2 RDRP RESP QL NAA+PROBE: NEGATIVE
SODIUM SERPL-SCNC: 139 MMOL/L (ref 136–145)
SODIUM SERPL-SCNC: 140 MMOL/L (ref 136–145)
SP GR UR STRIP: <=1.005
SP GR UR STRIP: >=1.03
SQUAMOUS #/AREA URNS AUTO: 1 /HPF
TSH SERPL-ACNC: 2.58 UIU/ML (ref 0.4–4)
UROBILINOGEN UR STRIP-ACNC: NEGATIVE EU/DL
UROBILINOGEN UR STRIP-ACNC: NEGATIVE EU/DL
VANCOMYCIN TROUGH SERPL-MCNC: 9.8 UG/ML (ref 10–22)
WBC # BLD AUTO: 10.45 K/UL (ref 3.9–12.7)
WBC # BLD AUTO: 4.95 K/UL (ref 3.9–12.7)
WBC #/AREA URNS AUTO: 52 /HPF (ref 0–5)
WBC #/AREA URNS HPF: 5 /HPF (ref 0–5)
WBC CLUMPS UR QL AUTO: ABNORMAL

## 2025-06-19 PROCEDURE — 71000039 HC RECOVERY, EACH ADD'L HOUR: Performed by: OBSTETRICS & GYNECOLOGY

## 2025-06-19 PROCEDURE — 85025 COMPLETE CBC W/AUTO DIFF WBC: CPT | Mod: ER | Performed by: STUDENT IN AN ORGANIZED HEALTH CARE EDUCATION/TRAINING PROGRAM

## 2025-06-19 PROCEDURE — 51701 INSERT BLADDER CATHETER: CPT

## 2025-06-19 PROCEDURE — 87154 CUL TYP ID BLD PTHGN 6+ TRGT: CPT | Mod: ER | Performed by: STUDENT IN AN ORGANIZED HEALTH CARE EDUCATION/TRAINING PROGRAM

## 2025-06-19 PROCEDURE — 25000003 PHARM REV CODE 250: Performed by: HOSPITALIST

## 2025-06-19 PROCEDURE — 25000003 PHARM REV CODE 250: Performed by: OBSTETRICS & GYNECOLOGY

## 2025-06-19 PROCEDURE — 87186 SC STD MICRODIL/AGAR DIL: CPT | Performed by: OBSTETRICS & GYNECOLOGY

## 2025-06-19 PROCEDURE — 36000705 HC OR TIME LEV I EA ADD 15 MIN: Performed by: OBSTETRICS & GYNECOLOGY

## 2025-06-19 PROCEDURE — 84702 CHORIONIC GONADOTROPIN TEST: CPT | Mod: ER | Performed by: STUDENT IN AN ORGANIZED HEALTH CARE EDUCATION/TRAINING PROGRAM

## 2025-06-19 PROCEDURE — 87075 CULTR BACTERIA EXCEPT BLOOD: CPT | Performed by: OBSTETRICS & GYNECOLOGY

## 2025-06-19 PROCEDURE — 37000008 HC ANESTHESIA 1ST 15 MINUTES: Performed by: OBSTETRICS & GYNECOLOGY

## 2025-06-19 PROCEDURE — 80053 COMPREHEN METABOLIC PANEL: CPT | Mod: ER | Performed by: STUDENT IN AN ORGANIZED HEALTH CARE EDUCATION/TRAINING PROGRAM

## 2025-06-19 PROCEDURE — 37000009 HC ANESTHESIA EA ADD 15 MINS: Performed by: OBSTETRICS & GYNECOLOGY

## 2025-06-19 PROCEDURE — 63600175 PHARM REV CODE 636 W HCPCS: Performed by: STUDENT IN AN ORGANIZED HEALTH CARE EDUCATION/TRAINING PROGRAM

## 2025-06-19 PROCEDURE — 88305 TISSUE EXAM BY PATHOLOGIST: CPT | Mod: TC | Performed by: OBSTETRICS & GYNECOLOGY

## 2025-06-19 PROCEDURE — 99900035 HC TECH TIME PER 15 MIN (STAT): Mod: ER

## 2025-06-19 PROCEDURE — 81001 URINALYSIS AUTO W/SCOPE: CPT | Mod: ER | Performed by: STUDENT IN AN ORGANIZED HEALTH CARE EDUCATION/TRAINING PROGRAM

## 2025-06-19 PROCEDURE — 83735 ASSAY OF MAGNESIUM: CPT | Performed by: STUDENT IN AN ORGANIZED HEALTH CARE EDUCATION/TRAINING PROGRAM

## 2025-06-19 PROCEDURE — 63600175 PHARM REV CODE 636 W HCPCS: Performed by: HOSPITALIST

## 2025-06-19 PROCEDURE — 84702 CHORIONIC GONADOTROPIN TEST: CPT | Performed by: OBSTETRICS & GYNECOLOGY

## 2025-06-19 PROCEDURE — 88305 TISSUE EXAM BY PATHOLOGIST: CPT | Mod: 26,,, | Performed by: PATHOLOGY

## 2025-06-19 PROCEDURE — 25000003 PHARM REV CODE 250: Performed by: STUDENT IN AN ORGANIZED HEALTH CARE EDUCATION/TRAINING PROGRAM

## 2025-06-19 PROCEDURE — 84100 ASSAY OF PHOSPHORUS: CPT | Mod: ER | Performed by: STUDENT IN AN ORGANIZED HEALTH CARE EDUCATION/TRAINING PROGRAM

## 2025-06-19 PROCEDURE — 84443 ASSAY THYROID STIM HORMONE: CPT | Mod: ER | Performed by: STUDENT IN AN ORGANIZED HEALTH CARE EDUCATION/TRAINING PROGRAM

## 2025-06-19 PROCEDURE — 59160 D & C AFTER DELIVERY: CPT | Mod: ,,, | Performed by: OBSTETRICS & GYNECOLOGY

## 2025-06-19 PROCEDURE — 25500020 PHARM REV CODE 255: Mod: ER | Performed by: STUDENT IN AN ORGANIZED HEALTH CARE EDUCATION/TRAINING PROGRAM

## 2025-06-19 PROCEDURE — 3E0P7VZ INTRODUCTION OF HORMONE INTO FEMALE REPRODUCTIVE, VIA NATURAL OR ARTIFICIAL OPENING: ICD-10-PCS | Performed by: OBSTETRICS & GYNECOLOGY

## 2025-06-19 PROCEDURE — 36415 COLL VENOUS BLD VENIPUNCTURE: CPT | Performed by: STUDENT IN AN ORGANIZED HEALTH CARE EDUCATION/TRAINING PROGRAM

## 2025-06-19 PROCEDURE — 93010 ELECTROCARDIOGRAM REPORT: CPT | Mod: ,,, | Performed by: INTERNAL MEDICINE

## 2025-06-19 PROCEDURE — 36000704 HC OR TIME LEV I 1ST 15 MIN: Performed by: OBSTETRICS & GYNECOLOGY

## 2025-06-19 PROCEDURE — 93005 ELECTROCARDIOGRAM TRACING: CPT | Mod: ER

## 2025-06-19 PROCEDURE — 36415 COLL VENOUS BLD VENIPUNCTURE: CPT | Performed by: OBSTETRICS & GYNECOLOGY

## 2025-06-19 PROCEDURE — 80202 ASSAY OF VANCOMYCIN: CPT | Performed by: STUDENT IN AN ORGANIZED HEALTH CARE EDUCATION/TRAINING PROGRAM

## 2025-06-19 PROCEDURE — 87040 BLOOD CULTURE FOR BACTERIA: CPT | Mod: ER | Performed by: STUDENT IN AN ORGANIZED HEALTH CARE EDUCATION/TRAINING PROGRAM

## 2025-06-19 PROCEDURE — 10D17ZZ EXTRACTION OF PRODUCTS OF CONCEPTION, RETAINED, VIA NATURAL OR ARTIFICIAL OPENING: ICD-10-PCS | Performed by: OBSTETRICS & GYNECOLOGY

## 2025-06-19 PROCEDURE — 21400001 HC TELEMETRY ROOM

## 2025-06-19 PROCEDURE — 63600175 PHARM REV CODE 636 W HCPCS: Performed by: ANESTHESIOLOGY

## 2025-06-19 PROCEDURE — 81000 URINALYSIS NONAUTO W/SCOPE: CPT | Mod: ER | Performed by: STUDENT IN AN ORGANIZED HEALTH CARE EDUCATION/TRAINING PROGRAM

## 2025-06-19 PROCEDURE — 87086 URINE CULTURE/COLONY COUNT: CPT | Performed by: STUDENT IN AN ORGANIZED HEALTH CARE EDUCATION/TRAINING PROGRAM

## 2025-06-19 PROCEDURE — 87502 INFLUENZA DNA AMP PROBE: CPT | Mod: ER | Performed by: HOSPITALIST

## 2025-06-19 PROCEDURE — 71000033 HC RECOVERY, INTIAL HOUR: Performed by: OBSTETRICS & GYNECOLOGY

## 2025-06-19 PROCEDURE — 96365 THER/PROPH/DIAG IV INF INIT: CPT | Mod: ER

## 2025-06-19 PROCEDURE — 85025 COMPLETE CBC W/AUTO DIFF WBC: CPT | Performed by: STUDENT IN AN ORGANIZED HEALTH CARE EDUCATION/TRAINING PROGRAM

## 2025-06-19 PROCEDURE — 83690 ASSAY OF LIPASE: CPT | Mod: ER | Performed by: STUDENT IN AN ORGANIZED HEALTH CARE EDUCATION/TRAINING PROGRAM

## 2025-06-19 PROCEDURE — U0002 COVID-19 LAB TEST NON-CDC: HCPCS | Mod: ER | Performed by: HOSPITALIST

## 2025-06-19 PROCEDURE — 63600175 PHARM REV CODE 636 W HCPCS: Mod: ER | Performed by: STUDENT IN AN ORGANIZED HEALTH CARE EDUCATION/TRAINING PROGRAM

## 2025-06-19 PROCEDURE — 83605 ASSAY OF LACTIC ACID: CPT | Mod: ER | Performed by: STUDENT IN AN ORGANIZED HEALTH CARE EDUCATION/TRAINING PROGRAM

## 2025-06-19 PROCEDURE — 83735 ASSAY OF MAGNESIUM: CPT | Mod: ER | Performed by: STUDENT IN AN ORGANIZED HEALTH CARE EDUCATION/TRAINING PROGRAM

## 2025-06-19 PROCEDURE — 84145 PROCALCITONIN (PCT): CPT | Mod: ER | Performed by: STUDENT IN AN ORGANIZED HEALTH CARE EDUCATION/TRAINING PROGRAM

## 2025-06-19 PROCEDURE — 25000003 PHARM REV CODE 250: Mod: ER | Performed by: STUDENT IN AN ORGANIZED HEALTH CARE EDUCATION/TRAINING PROGRAM

## 2025-06-19 RX ORDER — DIPHENHYDRAMINE HYDROCHLORIDE 50 MG/ML
12.5 INJECTION, SOLUTION INTRAMUSCULAR; INTRAVENOUS EVERY 6 HOURS PRN
Status: DISCONTINUED | OUTPATIENT
Start: 2025-06-19 | End: 2025-06-22 | Stop reason: HOSPADM

## 2025-06-19 RX ORDER — PHENYLEPHRINE HYDROCHLORIDE 10 MG/ML
INJECTION INTRAVENOUS
Status: DISCONTINUED | OUTPATIENT
Start: 2025-06-19 | End: 2025-06-19

## 2025-06-19 RX ORDER — GLUCAGON 1 MG
1 KIT INJECTION
Status: DISCONTINUED | OUTPATIENT
Start: 2025-06-19 | End: 2025-06-19

## 2025-06-19 RX ORDER — IBUPROFEN 400 MG/1
400 TABLET, FILM COATED ORAL EVERY 6 HOURS PRN
Status: DISCONTINUED | OUTPATIENT
Start: 2025-06-19 | End: 2025-06-22 | Stop reason: HOSPADM

## 2025-06-19 RX ORDER — LIDOCAINE HYDROCHLORIDE 20 MG/ML
INJECTION INTRAVENOUS
Status: DISCONTINUED | OUTPATIENT
Start: 2025-06-19 | End: 2025-06-19

## 2025-06-19 RX ORDER — HEPARIN SODIUM 5000 [USP'U]/ML
5000 INJECTION, SOLUTION INTRAVENOUS; SUBCUTANEOUS EVERY 8 HOURS
Status: DISCONTINUED | OUTPATIENT
Start: 2025-06-19 | End: 2025-06-19

## 2025-06-19 RX ORDER — METHYLERGONOVINE MALEATE 0.2 MG/1
0.2 TABLET ORAL 4 TIMES DAILY
Status: DISCONTINUED | OUTPATIENT
Start: 2025-06-19 | End: 2025-06-19

## 2025-06-19 RX ORDER — METHYLERGONOVINE MALEATE 0.2 MG/1
0.2 TABLET ORAL
Status: DISCONTINUED | OUTPATIENT
Start: 2025-06-19 | End: 2025-06-20

## 2025-06-19 RX ORDER — GLUCAGON 1 MG
1 KIT INJECTION
Status: DISCONTINUED | OUTPATIENT
Start: 2025-06-19 | End: 2025-06-22 | Stop reason: HOSPADM

## 2025-06-19 RX ORDER — ACETAMINOPHEN 500 MG
1000 TABLET ORAL
Status: COMPLETED | OUTPATIENT
Start: 2025-06-19 | End: 2025-06-19

## 2025-06-19 RX ORDER — HYDROMORPHONE HYDROCHLORIDE 2 MG/ML
0.2 INJECTION, SOLUTION INTRAMUSCULAR; INTRAVENOUS; SUBCUTANEOUS EVERY 5 MIN PRN
Status: DISCONTINUED | OUTPATIENT
Start: 2025-06-19 | End: 2025-06-19

## 2025-06-19 RX ORDER — SODIUM CHLORIDE 0.9 % (FLUSH) 0.9 %
10 SYRINGE (ML) INJECTION EVERY 12 HOURS PRN
Status: DISCONTINUED | OUTPATIENT
Start: 2025-06-19 | End: 2025-06-22 | Stop reason: HOSPADM

## 2025-06-19 RX ORDER — PROCHLORPERAZINE EDISYLATE 5 MG/ML
5 INJECTION INTRAMUSCULAR; INTRAVENOUS EVERY 30 MIN PRN
Status: DISCONTINUED | OUTPATIENT
Start: 2025-06-19 | End: 2025-06-19

## 2025-06-19 RX ORDER — MIDAZOLAM HYDROCHLORIDE 1 MG/ML
INJECTION INTRAMUSCULAR; INTRAVENOUS
Status: DISCONTINUED | OUTPATIENT
Start: 2025-06-19 | End: 2025-06-19

## 2025-06-19 RX ORDER — SODIUM CHLORIDE 0.9 % (FLUSH) 0.9 %
10 SYRINGE (ML) INJECTION
Status: DISCONTINUED | OUTPATIENT
Start: 2025-06-19 | End: 2025-06-19

## 2025-06-19 RX ORDER — ACETAMINOPHEN 10 MG/ML
INJECTION, SOLUTION INTRAVENOUS
Status: DISCONTINUED | OUTPATIENT
Start: 2025-06-19 | End: 2025-06-19

## 2025-06-19 RX ORDER — ACETAMINOPHEN 325 MG/1
650 TABLET ORAL EVERY 6 HOURS PRN
Status: DISCONTINUED | OUTPATIENT
Start: 2025-06-19 | End: 2025-06-22 | Stop reason: HOSPADM

## 2025-06-19 RX ORDER — IBUPROFEN 200 MG
16 TABLET ORAL
Status: DISCONTINUED | OUTPATIENT
Start: 2025-06-19 | End: 2025-06-22 | Stop reason: HOSPADM

## 2025-06-19 RX ORDER — ONDANSETRON HYDROCHLORIDE 2 MG/ML
INJECTION, SOLUTION INTRAVENOUS
Status: DISCONTINUED | OUTPATIENT
Start: 2025-06-19 | End: 2025-06-19

## 2025-06-19 RX ORDER — SODIUM CHLORIDE, SODIUM LACTATE, POTASSIUM CHLORIDE, CALCIUM CHLORIDE 600; 310; 30; 20 MG/100ML; MG/100ML; MG/100ML; MG/100ML
INJECTION, SOLUTION INTRAVENOUS CONTINUOUS
Status: DISCONTINUED | OUTPATIENT
Start: 2025-06-19 | End: 2025-06-20

## 2025-06-19 RX ORDER — IBUPROFEN 200 MG
24 TABLET ORAL
Status: DISCONTINUED | OUTPATIENT
Start: 2025-06-19 | End: 2025-06-22 | Stop reason: HOSPADM

## 2025-06-19 RX ORDER — PROPOFOL 10 MG/ML
INJECTION, EMULSION INTRAVENOUS
Status: DISCONTINUED | OUTPATIENT
Start: 2025-06-19 | End: 2025-06-19

## 2025-06-19 RX ORDER — MISOPROSTOL 100 MCG
TABLET ORAL
Status: DISCONTINUED | OUTPATIENT
Start: 2025-06-19 | End: 2025-06-19 | Stop reason: HOSPADM

## 2025-06-19 RX ORDER — FENTANYL CITRATE 50 UG/ML
INJECTION, SOLUTION INTRAMUSCULAR; INTRAVENOUS
Status: DISCONTINUED | OUTPATIENT
Start: 2025-06-19 | End: 2025-06-19

## 2025-06-19 RX ORDER — NALOXONE HCL 0.4 MG/ML
0.02 VIAL (ML) INJECTION
Status: DISCONTINUED | OUTPATIENT
Start: 2025-06-19 | End: 2025-06-22 | Stop reason: HOSPADM

## 2025-06-19 RX ORDER — ACETAMINOPHEN 325 MG/1
650 TABLET ORAL EVERY 6 HOURS PRN
Status: DISCONTINUED | OUTPATIENT
Start: 2025-06-19 | End: 2025-06-19

## 2025-06-19 RX ORDER — DEXAMETHASONE SODIUM PHOSPHATE 4 MG/ML
INJECTION, SOLUTION INTRA-ARTICULAR; INTRALESIONAL; INTRAMUSCULAR; INTRAVENOUS; SOFT TISSUE
Status: DISCONTINUED | OUTPATIENT
Start: 2025-06-19 | End: 2025-06-19

## 2025-06-19 RX ORDER — VANCOMYCIN 2 GRAM/500 ML IN 0.9 % SODIUM CHLORIDE INTRAVENOUS
2000
Status: DISCONTINUED | OUTPATIENT
Start: 2025-06-19 | End: 2025-06-20

## 2025-06-19 RX ORDER — POTASSIUM CHLORIDE 20 MEQ/1
40 TABLET, EXTENDED RELEASE ORAL ONCE
Status: COMPLETED | OUTPATIENT
Start: 2025-06-19 | End: 2025-06-19

## 2025-06-19 RX ORDER — MAGNESIUM SULFATE HEPTAHYDRATE 40 MG/ML
2 INJECTION, SOLUTION INTRAVENOUS ONCE
Status: COMPLETED | OUTPATIENT
Start: 2025-06-19 | End: 2025-06-19

## 2025-06-19 RX ADMIN — METHYLERGONOVINE 0.2 MG: 0.2 TABLET ORAL at 11:06

## 2025-06-19 RX ADMIN — PHENYLEPHRINE HYDROCHLORIDE 100 MCG: 10 INJECTION INTRAVENOUS at 02:06

## 2025-06-19 RX ADMIN — ACETAMINOPHEN 1000 MG: 500 TABLET ORAL at 12:06

## 2025-06-19 RX ADMIN — FENTANYL CITRATE 50 MCG: 50 INJECTION INTRAMUSCULAR; INTRAVENOUS at 02:06

## 2025-06-19 RX ADMIN — PIPERACILLIN SODIUM AND TAZOBACTAM SODIUM 4.5 G: 4; .5 INJECTION, POWDER, LYOPHILIZED, FOR SOLUTION INTRAVENOUS at 10:06

## 2025-06-19 RX ADMIN — DEXAMETHASONE SODIUM PHOSPHATE 8 MG: 4 INJECTION, SOLUTION INTRA-ARTICULAR; INTRALESIONAL; INTRAMUSCULAR; INTRAVENOUS; SOFT TISSUE at 02:06

## 2025-06-19 RX ADMIN — IBUPROFEN 400 MG: 400 TABLET ORAL at 11:06

## 2025-06-19 RX ADMIN — PIPERACILLIN AND TAZOBACTAM 4.5 G: 4; .5 INJECTION, POWDER, LYOPHILIZED, FOR SOLUTION INTRAVENOUS; PARENTERAL at 12:06

## 2025-06-19 RX ADMIN — SODIUM CHLORIDE: 0.9 INJECTION, SOLUTION INTRAVENOUS at 01:06

## 2025-06-19 RX ADMIN — ACETAMINOPHEN 650 MG: 325 TABLET ORAL at 10:06

## 2025-06-19 RX ADMIN — PROPOFOL 200 MG: 10 INJECTION, EMULSION INTRAVENOUS at 02:06

## 2025-06-19 RX ADMIN — MIDAZOLAM HYDROCHLORIDE 2 MG: 1 INJECTION, SOLUTION INTRAMUSCULAR; INTRAVENOUS at 02:06

## 2025-06-19 RX ADMIN — POTASSIUM CHLORIDE 40 MEQ: 1500 TABLET, EXTENDED RELEASE ORAL at 04:06

## 2025-06-19 RX ADMIN — LIDOCAINE HYDROCHLORIDE 100 MG: 20 INJECTION, SOLUTION INTRAVENOUS at 02:06

## 2025-06-19 RX ADMIN — PHENYLEPHRINE HYDROCHLORIDE 200 MCG: 10 INJECTION INTRAVENOUS at 02:06

## 2025-06-19 RX ADMIN — VANCOMYCIN HYDROCHLORIDE 2000 MG: 10 INJECTION, POWDER, LYOPHILIZED, FOR SOLUTION INTRAVENOUS at 04:06

## 2025-06-19 RX ADMIN — SODIUM CHLORIDE, POTASSIUM CHLORIDE, SODIUM LACTATE AND CALCIUM CHLORIDE: 600; 310; 30; 20 INJECTION, SOLUTION INTRAVENOUS at 11:06

## 2025-06-19 RX ADMIN — DIPHENHYDRAMINE HYDROCHLORIDE 12.5 MG: 50 INJECTION, SOLUTION INTRAMUSCULAR; INTRAVENOUS at 03:06

## 2025-06-19 RX ADMIN — ACETAMINOPHEN 1000 MG: 10 INJECTION, SOLUTION INTRAVENOUS at 02:06

## 2025-06-19 RX ADMIN — PROPOFOL 100 MG: 10 INJECTION, EMULSION INTRAVENOUS at 02:06

## 2025-06-19 RX ADMIN — MAGNESIUM SULFATE HEPTAHYDRATE 2 G: 40 INJECTION, SOLUTION INTRAVENOUS at 04:06

## 2025-06-19 RX ADMIN — HYDROMORPHONE HYDROCHLORIDE 0.2 MG: 2 INJECTION INTRAMUSCULAR; INTRAVENOUS; SUBCUTANEOUS at 04:06

## 2025-06-19 RX ADMIN — SODIUM CHLORIDE 2000 ML: 9 INJECTION, SOLUTION INTRAVENOUS at 12:06

## 2025-06-19 RX ADMIN — ONDANSETRON 8 MG: 2 INJECTION, SOLUTION INTRAMUSCULAR; INTRAVENOUS at 02:06

## 2025-06-19 RX ADMIN — IOHEXOL 100 ML: 350 INJECTION, SOLUTION INTRAVENOUS at 01:06

## 2025-06-19 RX ADMIN — PIPERACILLIN SODIUM AND TAZOBACTAM SODIUM 4.5 G: 4; .5 INJECTION, POWDER, LYOPHILIZED, FOR SOLUTION INTRAVENOUS at 06:06

## 2025-06-19 RX ADMIN — METHYLERGONOVINE 0.2 MG: 0.2 TABLET ORAL at 07:06

## 2025-06-19 RX ADMIN — VANCOMYCIN HYDROCHLORIDE 2750 MG: 1 INJECTION, POWDER, LYOPHILIZED, FOR SOLUTION INTRAVENOUS at 01:06

## 2025-06-19 NOTE — ANESTHESIA PREPROCEDURE EVALUATION
"                                                                                                             06/19/2025  Samantha Boston is a 40 y.o., female.  [facility]  Anesthesia Pre-Operative Evaluation       Patient Name: Samantha Boston  YOB: 1985  MRN: [id number]  CSN: [id number]      Code Status: Full Code   Date of Procedure: 6/19/2025  Anesthesia: Choice Procedure: Procedure(s) (LRB):  DILATION AND CURETTAGE, UTERUS, USING SUCTION (N/A)  Pre-Operative Diagnosis: Retained products of conception, postpartum [O72.0]  Proceduralist: Surgeons and Role:     * Dianelys Holden MD - Primary        SUBJECTIVE:   Samantha Boston is a 40 y.o. female who is here today for Procedure(s) (LRB):  DILATION AND CURETTAGE, UTERUS, USING SUCTION (N/A).   No notes on file    Anticoagulants   Medication Route Frequency    heparin (porcine) injection 5,000 Units Subcutaneous Q8H       she has a current medication list which includes the following long-term medication(s): amlodipine, nifedipine, and olmesartan-hydrochlorothiazide.   ALLERGIES:   Review of patient's allergies indicates:  No Known Allergies  LDA:          Lines/Drains/Airways       Peripheral Intravenous Line  Duration                  Peripheral IV - Single Lumen 06/19/25 0002 20 G Right Hand <1 day    Peripheral IV Single Lumen 06/19/25 1140 22 G Left;Posterior Hand <1 day                   RELEVANT MEDICATIONS:     Antibiotics (From admission, onward)      Start     Stop Route Frequency Ordered    06/19/25 1300  vancomycin 2 g in 0.9% sodium chloride 500 mL IVPB         -- IV Every 12 hours (non-standard times) 06/19/25 1043    06/19/25 1100  piperacillin-tazobactam (ZOSYN) 4.5 g in D5W 100 mL IVPB (MB+)         -- IV Every 8 hours (non-standard times) 06/19/25 0955    06/19/25 1054  vancomycin - pharmacy to dose  (vancomycin IVPB (PEDS and ADULTS))        Placed in "And" Linked Group    -- IV pharmacy to manage frequency " "06/19/25 0955          VTE Risk Mitigation (From admission, onward)           Ordered     heparin (porcine) injection 5,000 Units  Every 8 hours         06/19/25 0952     IP VTE HIGH RISK PATIENT  Once         06/19/25 0952     Place sequential compression device  Until discontinued         06/19/25 0952                    History:   There are no hospital problems to display for this patient.    Problem List[1]  Medical History   History reviewed. No pertinent past medical history.  Surgical History:    has a past surgical history that includes Tonsillectomy.   Social History:    reports that she has never smoked. She has never used smokeless tobacco. She reports that she does not drink alcohol and does not use drugs.     OBJECTIVE:     Vital Signs (Most Recent):  Temp: 36.9 °C (98.4 °F) (06/19/25 1209)  Pulse: (!) 124 (06/19/25 1118)  Resp: 20 (06/19/25 1118)  BP: 132/63 (06/19/25 1118)  SpO2: 98 % (06/19/25 1118) Vital Signs Range (Last 24H):  Temp:  [36.8 °C (98.3 °F)-39.4 °C (102.9 °F)]   Pulse:  [101-168]   Resp:  [18-33]   BP: (118-137)/(60-72)   SpO2:  [97 %-100 %]      Last 3 Vitals:       4/14/2021     2:26 PM 7/19/2024     4:01 PM 6/18/2025    11:50 PM   Vitals - 1 value per visit   SYSTOLIC  144 127   DIASTOLIC  87 72   Pulse  69 168   Temp  36.6 °C (97.9 °F) 39.4 °C (102.9 °F)   Resp  19 24   SPO2  99 % 98 %   Weight (lb)  260 250   Weight (kg)  117.935 113.399   Height  5' 8" (1.727 m) 5' 7" (1.702 m)   BMI (Calculated)  39.5 39.1   Pain Score Zero       Body mass index is 41.09 kg/m².   Wt Readings from Last 4 Encounters:   06/19/25 119 kg (262 lb 5.6 oz)   07/19/24 117.9 kg (260 lb)   11/16/17 119.8 kg (264 lb 3.5 oz)   11/15/17 119.1 kg (262 lb 9.6 oz)     Significant Labs:  Lab Results   Component Value Date    WBC 4.95 06/19/2025    HGB 12.8 06/19/2025    HCT 38.8 06/19/2025     06/19/2025     06/19/2025    K 4.1 06/19/2025     06/19/2025    CREATININE 0.9 06/19/2025    BUN 15 " 06/19/2025    CO2 18 (L) 06/19/2025    GLU 94 06/19/2025    CALCIUM 8.8 06/19/2025    MG 1.8 06/19/2025    PHOS 4.1 06/19/2025    ALKPHOS 144 (H) 06/19/2025    ALT 26 06/19/2025    AST 22 06/19/2025    ALBUMIN 4.1 06/19/2025    HGBA1C 5.8 (H) 08/20/2024     No LMP recorded.      EKG:   Results for orders placed or performed during the hospital encounter of 06/18/25   EKG 12-lead    Collection Time: 06/19/25 12:00 AM   Result Value Ref Range    QRS Duration 68 ms    [facility] QTC Calculation 480 ms    Narrative    Test Reason : R00.0,    Vent. Rate : 170 BPM     Atrial Rate : 170 BPM     P-R Int : 112 ms          QRS Dur :  68 ms      QT Int : 286 ms       P-R-T Axes :  52  53  72 degrees    QTcB Int : 480 ms    Sinus tachycardia  Nonspecific ST and T wave abnormality  Abnormal ECG  When compared with ECG of 16-Nov-2017 15:21,  Vent. rate has increased by 105 bpm  ST now depressed in Inferior leads  ST now depressed in Lateral leads  Confirmed by Geno Christianson ([id number]) on 6/19/2025 1:10:11 PM    Referred By: [email or web address] SELF           Confirmed By: Geno Christianson       ASSESSMENT/PLAN:         Pre-op Assessment    I have reviewed the Patient Summary Reports.     I have reviewed the Nursing Notes. I have reviewed the NPO Status.   I have reviewed the Medications.     Review of Systems      Physical Exam  General: Well nourished, Cooperative and Alert    Airway:  Mallampati: III / II  Mouth Opening: Normal  TM Distance: Normal  Tongue: Normal  Neck ROM: Normal ROM    Dental:  Intact    Chest/Lungs:  Normal Respiratory Rate    Heart:  Rate: Normal  Rhythm: Regular Rhythm        Anesthesia Plan  Type of Anesthesia, risks & benefits discussed:    Anesthesia Type: Gen ETT  Intra-op Monitoring Plan: Standard ASA Monitors  Post Op Pain Control Plan: IV/PO Opioids PRN and multimodal analgesia  Induction:  IV  Airway Plan: Video, Post-Induction  Informed Consent: Informed consent signed with the Patient and all parties understand  the risks and agree with anesthesia plan.  All questions answered.   ASA Score: 1  Day of Surgery Review of History & Physical: H&P Update referred to the surgeon/provider.    Ready For Surgery From Anesthesia Perspective.     .           [1]   Patient Active Problem List  Diagnosis    Refractive error    Chalazion of right upper eyelid    Wears contact lenses

## 2025-06-19 NOTE — ED NOTES
Pt presents with c/o chills, body aches, HA and R flank pain that began just pta. Pt is 9 days PP after  and was just discharged from [facility] this evening after being evaluated for HTN and preeclamsia. Upon arrival pt noted to be tachycardic, tachypneic and febrile which she reports did not occur at [facility]. Pt denies any abnormal characteristics to lochia and denies any abdominal/pelvic pain.

## 2025-06-19 NOTE — ED PROVIDER NOTES
Encounter Date: 2025       History     Chief Complaint   Patient presents with    Chills     Chills and generalized body aches. Denies N/V and diarrhea.      HPI    40-year-old female with a history of hypertension s/p  at 39w0d on  complicated by superimposed preeclampsia with severe features presents to ED for evaluation of chills, generalized body aches, right flank pain, tremulousness and palpitations that worsened today.  She notes the right flank pain moves across her entire abdomen and then around her entire body.  She reports during an epidural attempting may have hit her left kidney.  She denies any abnormal vaginal discharge for lower abdominal pain.  She notes she has had a small amount of postop vaginal bleeding that she believes to be expected.  She notes she has had UTIs in the past.  She denies any cough, congestion, runny nose, shortness of breath, chest pain, dysuria, urinary frequency, hematuria.    Review of patient's allergies indicates:  No Known Allergies  History reviewed. No pertinent past medical history.  Past Surgical History:   Procedure Laterality Date    TONSILLECTOMY       Family History   Problem Relation Name Age of Onset    Cataracts Maternal Grandmother      Glaucoma Maternal Grandmother      Hypertension Mother      Amblyopia Neg Hx      Blindness Neg Hx      Cancer Neg Hx      Macular degeneration Neg Hx      Retinal detachment Neg Hx      Strabismus Neg Hx       Social History[1]  Review of Systems   Constitutional:  Positive for chills and fever.   HENT:  Negative for sore throat.    Respiratory:  Negative for shortness of breath.    Cardiovascular:  Negative for chest pain.   Gastrointestinal:  Positive for abdominal pain. Negative for diarrhea, nausea and vomiting.   Genitourinary:  Positive for flank pain and vaginal bleeding. Negative for dysuria, frequency, hematuria, urgency and vaginal discharge.   Musculoskeletal:  Positive for myalgias. Negative for back  pain.   Skin:  Negative for rash.   Neurological:  Negative for weakness.   Hematological:  Does not bruise/bleed easily.       Physical Exam     Initial Vitals [06/18/25 2350]   BP Pulse Resp Temp SpO2   127/72 (!) 168 (!) 24 (!) 102.9 °F (39.4 °C) 98 %      MAP       --         Physical Exam    Constitutional: Vital signs are normal. She appears well-developed and well-nourished.  Non-toxic appearance. She does not have a sickly appearance. She does not appear ill.   HENT:   Head: Normocephalic and atraumatic. Mouth/Throat: Mucous membranes are normal.   Eyes: EOM are normal.   Neck: Neck supple.   Cardiovascular:  Regular rhythm.   Tachycardia present.         Pulmonary/Chest: No respiratory distress. She has no wheezes. She has no rhonchi. She has no rales.   Abdominal: Abdomen is soft. Bowel sounds are normal. There is abdominal tenderness (Mild in the left upper quadrant).   No right CVA tenderness.  No left CVA tenderness. There is no rebound and no guarding.   Musculoskeletal:      Cervical back: Neck supple.     Neurological: She is alert. She has normal strength. No sensory deficit.   Skin: Skin is warm and dry. No rash noted.   Psychiatric: She has a normal mood and affect.         ED Course   Procedures  Labs Reviewed   COMPREHENSIVE METABOLIC PANEL - Abnormal       Result Value    Sodium 139      Potassium 4.1      Chloride 106      CO2 18 (*)     Glucose 94      BUN 15      Creatinine 0.9      Calcium 8.8      Protein Total 7.5      Albumin 4.1      Bilirubin Total 0.4       (*)     AST 22      ALT 26      Anion Gap 15      eGFR >60     LACTIC ACID, PLASMA - Abnormal    Lactic Acid Level 4.1 (*)     Narrative:     Falsely low lactic acid results can be found in samples containing >=13.0 mg/dL total bilirubin and/or >=3.5 mg/dL direct bilirubin.    URINALYSIS, REFLEX TO URINE CULTURE - Abnormal    Color, UA Yellow      Appearance, UA Clear      pH, UA 6.0      Spec Grav UA <=1.005 (*)      Protein, UA Negative      Glucose, UA Negative      Ketones, UA Negative      Bilirubin, UA Negative      Blood, UA 1+ (*)     Nitrites, UA Negative      Urobilinogen, UA Negative      Leukocyte Esterase, UA Trace (*)    CBC WITH DIFFERENTIAL - Abnormal    WBC 4.95      RBC 4.50      HGB 12.8      HCT 38.8      MCV 86      MCH 28.4      MCHC 33.0      RDW 14.4      Platelet Count 311      MPV 9.3      Nucleated RBC 0      Neut % 85.7 (*)     Lymph % 11.7 (*)     Mono % 0.8 (*)     Eos % 0.0      Basophil % 0.2      Imm Grans % 1.6 (*)     Neut # 4.24      Lymph # 0.58 (*)     Mono # 0.04 (*)     Eos # 0.00      Baso # 0.01      Imm Grans # 0.08 (*)    LIPASE - Normal    Lipase Level 94     MAGNESIUM - Normal    Magnesium  1.8     PHOSPHORUS - Normal    Phosphorus Level 4.1     LACTIC ACID, PLASMA - Normal    Lactic Acid Level 0.9      Narrative:     Falsely low lactic acid results can be found in samples containing >=13.0 mg/dL total bilirubin and/or >=3.5 mg/dL direct bilirubin.    TSH - Normal    TSH 2.580     CULTURE, BLOOD   CULTURE, BLOOD   CBC W/ AUTO DIFFERENTIAL    Narrative:     The following orders were created for panel order CBC auto differential.  Procedure                               Abnormality         Status                     ---------                               -----------         ------                     CBC with Differential[[id number]]       Abnormal            Final result                 Please view results for these tests on the individual orders.   URINALYSIS MICROSCOPIC    RBC, UA 1      WBC, UA 5      Microscopic Comment       PROCALCITONIN   GREY TOP URINE HOLD   SARS-COV-2 RDRP GENE   POCT INFLUENZA A/B MOLECULAR          Imaging Results              US Pelvis Limited Non OB (In process)  Result time 06/19/25 03:11:40   Procedure changed from US Pelvis Complete Non OB                    X-Ray Chest AP Portable (In process)  Result time 06/19/25 00:30:45                     CT  Abdomen Pelvis With IV Contrast NO Oral Contrast (In process)                      Medications   vancomycin (VANCOCIN) 2,750 mg in 0.9% NaCl 500 mL IVPB (2,750 mg Intravenous New Bag 6/19/25 0135)   piperacillin-tazobactam (ZOSYN) 4.5 g in D5W 100 mL IVPB (MB+) (0 g Intravenous Stopped 6/19/25 0136)   sodium chloride 0.9% bolus 2,000 mL 2,000 mL (0 mLs Intravenous Stopped 6/19/25 0345)   acetaminophen tablet 1,000 mg (1,000 mg Oral Given 6/19/25 0009)   iohexoL (OMNIPAQUE 350) injection 100 mL (100 mLs Intravenous Given 6/19/25 0103)     Medical Decision Making  Amount and/or Complexity of Data Reviewed  Labs: ordered.  Radiology: ordered.    Risk  OTC drugs.  Prescription drug management.  Decision regarding hospitalization.    Tachycardic to 168, T of 102.9°, RR 24, SpO2 98%, /72  Patient is ill-appearing  Patient concerning for sepsis   Blood cultures collected and patient covered with broad-spectrum antibiotics   Given 2 L of fluid   Differential includes but not limited to sepsis in the setting of retained products of conception, UTI, other acute intra-abdominal process such as an abscess, pneumonia, viral illness, meningitis, encephalitis, hyperthyroidism, thyrotoxicosis, dehydration  She does not have significant lower abdominal tenderness and denies any vaginal discharge; possible it is due to retained products of conception but I have a low suspicion based on her exam we will obtain CT abdomen and pelvis to more broadly evaluate for source of infection  CT abdomen and pelvis is unremarkable   Pelvic ultrasound shows an endometrial stripe measuring 4 cm with some heterogeneity been no hypervascularity to suggest retained products of conception; her exam is not also consistent with a retained products of conception   WBC of 4.95, bicarb of 18, TSH 2.5  Initial lactate 4.1, repeat lactate 0.9; initial might have been a bad draw  UA here is unremarkable; urine ear was collected after receiving  antibiotics  UA performed at [facility] earlier in the evening was nitrite positive  Suspect this is likely source of her sirs response  Patient is still persistently tachycardic to the 110s despite fluid resuscitation  Do feel she would benefit from admission for continued IV antibiotics and blood culture follow up   Patient is amenable to this plan   Spoke with Hospital Medicine who accepted the patient for admission   Transfer to [facility] initiated                                    Clinical Impression:  Final diagnoses:  [R00.0] Tachycardia  [Z13.6] Screening for cardiovascular condition  [N39.0] Urinary tract infection without hematuria, site unspecified (Primary)  [A41.9] Sepsis, due to unspecified organism, unspecified whether acute organ dysfunction present          ED Disposition Condition    Admit                       [1]   Social History  Tobacco Use    Smoking status: Never    Smokeless tobacco: Never   Substance Use Topics    Alcohol use: No    Drug use: No        Omkar Woods MD  06/19/25 0407

## 2025-06-19 NOTE — OP NOTE
06/19/2025    Procedure: dilation and currettage     Pre-Op Diagnosis: possible retained placenta     Post-Op Diagnosis: same     Anesthesia: general    Complications: None    Condition: Stable    EBL: 250 cc    Primary Surgeon: Dianelys Holden    Assistant: none     Findings: cervix dilated to 1 cm, blood and possible tissue removed     Specimen: endometrial curetting     TECHNIQUE: The patient was taken to the Operating Room where her anesthesia  was found to be adequate. Her legs were placed in Eric stirrups. She was   then prepared and draped in normal sterile fashion.     An weighted speculum was placed into the vagina and a right angle retractor was used to visualize the cervix.. allis tenaculum was placed at the anterior cervix.    The cervix was already dilated. The suction was advanced to the fundus and rotated in a circumferential fashion. Endometrial curetting and blood was removed A sharp currette was advanced to the fundus and uterine curretage was preformed until the endometrium had a good uterine cry. An ultrasound was used for guidance to make sure we were at the fundus. The lining appeared thickened before but thin afterwards. Cytotec 600 mcg was given. Bleeding was minimal. The currettings were removed from the field and sent to pathology. The tenaculum was removed and the sites were made hemostatic with silver nitrate. No vaginal bleeding was noted.      The patient tolerated the procedure well. All instruments were then removed   from the vagina. The patient was then taken to the PACU in stable condition.

## 2025-06-19 NOTE — ASSESSMENT & PLAN NOTE
Patient has Abnormal Magnesium: hypomagnesemia. Will continue to monitor electrolytes closely. Will replace the affected electrolytes and repeat labs to be done after interventions completed. The patient's magnesium results have been reviewed and are listed below.  Recent Labs   Lab 06/19/25  1527   MG 1.7    monitor

## 2025-06-19 NOTE — PROGRESS NOTES
Patient doing well after D&C. Area of firmness in breast resolved after pumping. Discussed will send tissue from D&C off to see if was retained POC. For now continue antibiotics and monitor.

## 2025-06-19 NOTE — H&P
[facility] Medicine  History & Physical    Patient Name: Samantha Boston  MRN: [id number]  Patient Class: IP- Inpatient  Admission Date: 2025  Attending Physician: Bk Calero MD   Primary Care Provider: Sandor Funez MD         Patient information was obtained from patient and ER records.     Subjective:     Principal Problem:Sepsis    Chief Complaint:   Chief Complaint   Patient presents with    Chills     Chills and generalized body aches. Denies N/V and diarrhea.         HPI: 39 yo G , 10 days post partum which was complicated by pre eclampsia, patient presented with fever and not feeling well  Patient states she was feeling okay until suddenly at 5 am yesterday, she had sudden onset chills, she was shivering and not feeling well, then developed generalized body aches mostly on the flanks with repeated episodes of shivering , and noticed that she started to have high fever so decided to come to the ED. She is still having vaginal bleeding but not heavy and she noticed possible mal odorous vaginal discharge as well, she denies dysuria or frequency, she was breast feeding with no issues.      History reviewed. No pertinent past medical history.    Past Surgical History:   Procedure Laterality Date    TONSILLECTOMY         Review of patient's allergies indicates:  No Known Allergies    No current facility-administered medications on file prior to encounter.     Current Outpatient Medications on File Prior to Encounter   Medication Sig    amLODIPine (NORVASC) 5 MG tablet     blood pressure test kit-large Kit 1 Units by Misc.(Non-Drug; Combo Route) route once daily.    NIFEdipine (PROCARDIA-XL) 30 MG (OSM) 24 hr tablet     norgestimate-ethinyl estradiol (MONONESSA, 28,) 0.25-35 mg-mcg per tablet Mononessa (28) 0.25 mg-35 mcg tablet    olmesartan-hydrochlorothiazide (BENICAR HCT) 20-12.5 mg per tablet      Family History       Problem Relation (Age of Onset)    Cataracts Maternal  Grandmother    Glaucoma Maternal Grandmother    Hypertension Mother          Tobacco Use    Smoking status: Never    Smokeless tobacco: Never   Substance and Sexual Activity    Alcohol use: No    Drug use: No    Sexual activity: Yes     Review of Systems   Constitutional:  Positive for chills and fever.   HENT: Negative.     Respiratory: Negative.     Cardiovascular: Negative.    Gastrointestinal:  Positive for abdominal pain.   Genitourinary:  Positive for vaginal bleeding and vaginal discharge.   Musculoskeletal: Negative.    Skin: Negative.    Neurological: Negative.    Psychiatric/Behavioral: Negative.       Objective:     Vital Signs (Most Recent):  Temp: 97.8 °F (36.6 °C) (06/19/25 1600)  Pulse: 86 (06/19/25 1600)  Resp: 18 (06/19/25 1600)  BP: (!) 102/59 (06/19/25 1600)  SpO2: 95 % (06/19/25 1600) Vital Signs (24h Range):  Temp:  [97.7 °F (36.5 °C)-102.9 °F (39.4 °C)] 97.8 °F (36.6 °C)  Pulse:  [] 86  Resp:  [18-33] 18  SpO2:  [92 %-100 %] 95 %  BP: ()/(53-72) 102/59     Weight: 119 kg (262 lb 5.6 oz)  Body mass index is 41.09 kg/m².     Physical Exam  Constitutional:       Appearance: She is ill-appearing.   Cardiovascular:      Rate and Rhythm: Normal rate and regular rhythm.   Pulmonary:      Effort: Pulmonary effort is normal. No respiratory distress.      Breath sounds: Normal breath sounds.   Abdominal:      Palpations: Abdomen is soft.      Tenderness: There is abdominal tenderness.   Skin:     General: Skin is warm and dry.   Neurological:      General: No focal deficit present.      Mental Status: She is alert and oriented to person, place, and time. Mental status is at baseline.                Significant Labs: All pertinent labs within the past 24 hours have been reviewed.    Significant Imaging: I have reviewed all pertinent imaging results/findings within the past 24 hours.  Assessment/Plan:     Assessment & Plan  Sepsis  This patient does have evidence of infective focus  My overall  "impression is sepsis without organ dysfunction.  Source: Urinary Tract Infection and Intra-abdominal Infection  Antibiotics given-   Antibiotics (72h ago, onward)      Start     Stop Route Frequency Ordered    06/19/25 1300  vancomycin 2 g in 0.9% sodium chloride 500 mL IVPB         -- IV Every 12 hours (non-standard times) 06/19/25 1043    06/19/25 1100  piperacillin-tazobactam (ZOSYN) 4.5 g in D5W 100 mL IVPB (MB+)         -- IV Every 8 hours (non-standard times) 06/19/25 0955    06/19/25 1054  vancomycin - pharmacy to dose  (vancomycin IVPB (PEDS and ADULTS))        Placed in "And" Linked Group    -- IV pharmacy to manage frequency 06/19/25 0955          Latest lactate reviewed-  Recent Labs   Lab 06/19/25  0228   LACTATE 0.9       Will Not start Pressors- Levophed for MAP of 65    Follow urine culture  Patient have retained product of conception, obgyne evaluated the patient and D&C was performed today, sent for culture  Will get renal US to evaluate for pyelonephritis given suspicion of UTI with flank pain  Cont broad spectrum antibiotics and fluid resuscitation      Acute blood loss anemia  Anemia is likely due to acute blood loss which was from recent delivery and D&C. Most recent hemoglobin and hematocrit are listed below.  Recent Labs     06/19/25  0000 06/19/25  1332   HGB 12.8 11.4*   HCT 38.8 35.0*     Plan  - Monitor serial CBC: Daily  - Transfuse PRBC if patient becomes hemodynamically unstable, symptomatic or H/H drops below 7/21.  - Patient has not received any PRBC transfusions to date  - Patient's anemia is currently stable  - cont to monitor  Hypokalemia  Patient's most recent potassium results are listed below.   Recent Labs     06/18/25  2137 06/19/25  0000 06/19/25  1332   K 3.7 4.1 3.3*     Plan  - Replete potassium per protocol  - Monitor potassium Daily  - Patient's hypokalemia is stable  - replete  Hypomagnesemia  Patient has Abnormal Magnesium: hypomagnesemia. Will continue to monitor " electrolytes closely. Will replace the affected electrolytes and repeat labs to be done after interventions completed. The patient's magnesium results have been reviewed and are listed below.  Recent Labs   Lab 06/19/25  1527   MG 1.7    monitor  Pre-eclampsia in third trimester  Patient had recent pre eclampsia and was continued on amlodipine  Will hold for now given soft BP    VTE Risk Mitigation (From admission, onward)           Ordered     heparin (porcine) injection 5,000 Units  Every 8 hours         06/19/25 0952     IP VTE HIGH RISK PATIENT  Once         06/19/25 0952     Place sequential compression device  Until discontinued         06/19/25 0952                    SDOH Screening:  The patient was screened for utility difficulties, food insecurity, transport difficulties, housing insecurity, and interpersonal safety and there were no concerns identified this admission.                Bk Calero MD  Department of [facility] Medicine  [facility]

## 2025-06-19 NOTE — HPI
39 yo G , 10 days post partum which was complicated by pre eclampsia, patient presented with fever and not feeling well  Patient states she was feeling okay until suddenly at 5 am yesterday, she had sudden onset chills, she was shivering and not feeling well, then developed generalized body aches mostly on the flanks with repeated episodes of shivering , and noticed that she started to have high fever so decided to come to the ED. She is still having vaginal bleeding but not heavy and she noticed possible mal odorous vaginal discharge as well, she denies dysuria or frequency, she was breast feeding with no issues.

## 2025-06-19 NOTE — PLAN OF CARE
VIRTUAL NURSE:  Cued into patient's room.  Permission received per patient to turn camera to view patient.  Introduced as VN that will be working with floor nurse and nursing assistant.  Educated patient on VN's role in patient care and  VIP model.  Plan of care reviewed with patient.  Education per flowsheet.   Informed patient that staff will round on them every 2 hours but to use call light for any other needs they may have; informed of fall risk and fall precautions.  Patient verbalized understanding.  Call light within reach; bed siderails up x3.  Opportunity given for questions and questions answered.  Admission assessment questions answered.  Patient denies complaints or any needs at this time. Instructed to call for assistance.  Will cont to monitor and intervene as needed.    Labs, notes, orders, and careplan reviewed.   Problem: Adult Inpatient Plan of Care  Goal: Plan of Care Review  Outcome: Progressing

## 2025-06-19 NOTE — CONSULTS
2025    Chief complaint:     HPI: 40 y.o. yo  10 days s/p , she was induced with HTN and developed pre-e with severe features so was on magnesium for 24 hours after. No lacerations, no complications with delivery. Was on home procardia.  Is breast feeding. Hasn't pumped or breastfeed since yesterday, is feeling a firmer area on the left breast. Who presented to the ER c/o chills. Was found to be febrile and admitted to [place] for fever of unknown origin. She was having left flank pain x 1 day. But resolved since being here. No dysuria. Having normal light vaginal bleeding, did have  a slight vaginal odor, does have H/o BV. Normal BM.      GYN history: nl/monthly   No history of abn pap, no history of STDs    OB history: 2 , complicated by Pre-e    History reviewed. No pertinent past medical history.  Past Surgical History:   Procedure Laterality Date    TONSILLECTOMY       Social History[1]  Family History   Problem Relation Name Age of Onset    Cataracts Maternal Grandmother      Glaucoma Maternal Grandmother      Hypertension Mother      Amblyopia Neg Hx      Blindness Neg Hx      Cancer Neg Hx      Macular degeneration Neg Hx      Retinal detachment Neg Hx      Strabismus Neg Hx       Review of patient's allergies indicates:  No Known Allergies      Meds: procardia 60 xl     ROS:   Negative     Temp:  [98.3 °F (36.8 °C)-102.5 °F (39.2 °C)]   Pulse:  [101-124]   Resp:  [18-20]   BP: (118-137)/(62-68)   SpO2:  [97 %-100 %]     APPEARANCE: Well nourished, well developed, in no acute distress.  NEUROLOGIC: orientated to person, place and time, normal mood and affect     SKIN: no rashes or lesions  RESPIRATORY: CTA A   CARDIOVASCULAR: RRR,   ABDOMEN: Soft. No tenderness or masses. No CVA tenderness. No hernias  PELVIC: limited exam in bed   EXTERNAL GENITALIA/VULVA: No lesions. Normal female genitalia.  URETHRAL MEATUS: Normal size and location, no lesions, no prolapse.  URETHRA: No masses,  tenderness, prolapse or scarring.  BLADDER: non-tender, no masses  VAGINA: Moist and well rugated, lochia with mild odor, no tissue or abnormality noted   CERVIX: No lesions and discharge.  UTERUS: enlarged, mildly tender  ADNEXA: No masses or tenderness.      S/p    Fever   Thickened EMS     Plan:   Encouraged to pump and ok to breast feed with current antibiotics. Possible UTI vs pyleo, had urine yesterday with nitrates, no urine culture done but just collected. Discussed thickened EMS, mildly tender on exam, discussed possible retained POC. Not obvious endometritis but in light of no other clear diagnosis would recommend D&C because if is retained POC won't improved until resolved. She agrees, consents signed and surgery notified.          [1]   Social History  Socioeconomic History    Marital status: Single   Tobacco Use    Smoking status: Never    Smokeless tobacco: Never   Substance and Sexual Activity    Alcohol use: No    Drug use: No    Sexual activity: Yes     Social Drivers of Health     Financial Resource Strain: Low Risk  (2025)    Overall Financial Resource Strain (CARDIA)     Difficulty of Paying Living Expenses: Not hard at all   Food Insecurity: No Food Insecurity (2025)    Hunger Vital Sign     Worried About Running Out of Food in the Last Year: Never true     Ran Out of Food in the Last Year: Never true   Transportation Needs: No Transportation Needs (2025)    PRAPARE - Transportation     Lack of Transportation (Medical): No     Lack of Transportation (Non-Medical): No   Physical Activity: Insufficiently Active (2025)    Received from [facility]    Exercise Vital Sign     Days of Exercise per Week: 3 days     Minutes of Exercise per Session: 20 min   Stress: Stress Concern Present (2025)    [employer] - Occupational Stress Questionnaire     Feeling of Stress : Rather much   Housing Stability: Low Risk  (2025)    Housing Stability Vital  Sign     Unable to Pay for Housing in the Last Year: No     Number of Times Moved in the Last Year: 0     Homeless in the Last Year: No

## 2025-06-19 NOTE — PROGRESS NOTES
Pharmacokinetic Initial Assessment: IV Vancomycin    Assessment/Plan:    Initiate intravenous vancomycin with loading dose of 2750 mg once ( was given in ER ) followed by a maintenance dose of vancomycin 2000 mg IV every 12 hours  Desired empiric serum trough concentration is 15 to 20 mcg/mL  Draw vancomycin trough level 60 min prior to fourth dose on 6/20 at approximately 1200  Pharmacy will continue to follow and monitor vancomycin.      Please contact pharmacy at extension [phone] with any questions regarding this assessment.     Thank you for the consult,   Stephanie Chun       Patient brief summary:  Samantha Boston is a 40 y.o. female initiated on antimicrobial therapy with IV Vancomycin for treatment of suspected sepsis    Drug Allergies:   Review of patient's allergies indicates:  No Known Allergies    Actual Body Weight:   113.4 kg     Renal Function:   Estimated Creatinine Clearance: 108 mL/min (based on SCr of 0.9 mg/dL).,       CBC (last 72 hours):  Recent Labs   Lab Result Units 06/19/25  0000   WBC K/uL 4.95   HGB gm/dL 12.8   HCT % 38.8   Platelet Count K/uL 311   Lymph % % 11.7*   Mono % % 0.8*   Eos % % 0.0   Basophil % % 0.2       Metabolic Panel (last 72 hours):  Recent Labs   Lab Result Units 06/18/25  2100 06/18/25  2137 06/19/25  0000 06/19/25  0210   Sodium mmol/L  --  139 139  --    Potassium mmol/L  --   --  4.1  --    Chloride mmol/L  --  108 106  --    CO2 mmol/L  --   --  18*  --    Carbon Dioxide mmol/L  --  22*  --   --    Glucose mg/dL  --  109* 94  --    Glucose, UA  Normal  --   --  Negative   BUN mg/dL  --   --  15  --    Creatinine mg/dL  --  0.84 0.9  --    Albumin g/dL  --  3.8 4.1  --    Total Bilirubin mg/dL  --  0.4  --   --    Bilirubin Total mg/dL  --   --  0.4  --    ALP unit/L  --   --  144*  --    AST unit/L  --  17 22  --    ALT unit/L  --  16 26  --    Magnesium  mg/dL  --   --  1.8  --    Magnesium Level mg/dL  --  1.9  --   --    Phosphorus Level mg/dL  --   --  4.1   "--        Drug levels (last 3 results):  No results for input(s): "VANCOMYCINRA", "VANCORANDOM", "VANCOMYCINPE", "VANCOPEAK", "VANCOMYCINTR", "VANCOTROUGH" in the last 72 hours.    Microbiologic Results:  Microbiology Results (last 7 days)       Procedure Component Value Units Date/Time    Influenza A & B by Molecular [[id number]]  (Normal) Collected: 06/19/25 0426    Order Status: Completed Specimen: Nasal Swab Updated: 06/19/25 0449     INFLUENZA A MOLECULAR Negative     INFLUENZA B MOLECULAR  Negative    Blood culture x two cultures. Draw prior to antibiotics. [[id number]] Collected: 06/19/25 0002    Order Status: Sent Specimen: Blood from Peripheral, Hand, Right Updated: 06/19/25 0025    Blood culture x two cultures. Draw prior to antibiotics. [[id number]] Collected: 06/19/25 0000    Order Status: Sent Specimen: Blood from Peripheral, Forearm, Left Updated: 06/19/25 0025            "

## 2025-06-19 NOTE — LACTATION NOTE
Received notification from PAYAL Rosado, that pt was in need of a breast pump.  Rounded on pt in room 534. Pt reports that she exclusively breastfeeds as well as uses portable pump at home. Reports that she has not pumped or  since yesterday in morning. Breasts currently full and tender, per pt.  Pt set up with [facility] double electric breast pump. Given education and demonstration on pump operation, pump supplies, hand hygiene, cleansing and storage of parts, milk storage, and milk supply. Pt verbalized understanding.   Assisted pt with pumping, per pt's request. Pt reported that left nipple was tender with pumping. Tried both 21 mm and 27 mm flanges for comfort and both were more painful than 24 mm flange. Turned down suction strength and pt reported that tenderness greatly improved. Pumping was cut short by pt being brought to procedure at this time. This RN washed all parts and encouraged pt to pump when she comes back from procedure and every 3 hours to maintain milk supply.  Pt reports that she is upset about pumping and dumping her milk. Pt was given [facility] medication informational handouts on piperacillin, heparin, vancomycin, and omnipaque. Reviewed info with pt and encouraged her to contact her baby's pediatrician regarding milk safety with these drugs. Encouraged her to save her milk until she speaks with pediatrician. Pt verbalized understanding.    Milk was collected, labeled, and brought to [facility] for refrigeration. Extra collection bottles given for EBM.  Encouraged pt to call [facility] if further pumping assistance is needed or if she has questions/concerns. Pt verbalized understanding.

## 2025-06-19 NOTE — SUBJECTIVE & OBJECTIVE
History reviewed. No pertinent past medical history.    Past Surgical History:   Procedure Laterality Date    TONSILLECTOMY         Review of patient's allergies indicates:  No Known Allergies    No current facility-administered medications on file prior to encounter.     Current Outpatient Medications on File Prior to Encounter   Medication Sig    amLODIPine (NORVASC) 5 MG tablet     blood pressure test kit-large Kit 1 Units by Misc.(Non-Drug; Combo Route) route once daily.    NIFEdipine (PROCARDIA-XL) 30 MG (OSM) 24 hr tablet     norgestimate-ethinyl estradiol (MONONESSA, 28,) 0.25-35 mg-mcg per tablet Mononessa (28) 0.25 mg-35 mcg tablet    olmesartan-hydrochlorothiazide (BENICAR HCT) 20-12.5 mg per tablet      Family History       Problem Relation (Age of Onset)    Cataracts Maternal Grandmother    Glaucoma Maternal Grandmother    Hypertension Mother          Tobacco Use    Smoking status: Never    Smokeless tobacco: Never   Substance and Sexual Activity    Alcohol use: No    Drug use: No    Sexual activity: Yes     Review of Systems   Constitutional:  Positive for chills and fever.   HENT: Negative.     Respiratory: Negative.     Cardiovascular: Negative.    Gastrointestinal:  Positive for abdominal pain.   Genitourinary:  Positive for vaginal bleeding and vaginal discharge.   Musculoskeletal: Negative.    Skin: Negative.    Neurological: Negative.    Psychiatric/Behavioral: Negative.       Objective:     Vital Signs (Most Recent):  Temp: 97.8 °F (36.6 °C) (06/19/25 1600)  Pulse: 86 (06/19/25 1600)  Resp: 18 (06/19/25 1600)  BP: (!) 102/59 (06/19/25 1600)  SpO2: 95 % (06/19/25 1600) Vital Signs (24h Range):  Temp:  [97.7 °F (36.5 °C)-102.9 °F (39.4 °C)] 97.8 °F (36.6 °C)  Pulse:  [] 86  Resp:  [18-33] 18  SpO2:  [92 %-100 %] 95 %  BP: ()/(53-72) 102/59     Weight: 119 kg (262 lb 5.6 oz)  Body mass index is 41.09 kg/m².     Physical Exam  Constitutional:       Appearance: She is ill-appearing.    Cardiovascular:      Rate and Rhythm: Normal rate and regular rhythm.   Pulmonary:      Effort: Pulmonary effort is normal. No respiratory distress.      Breath sounds: Normal breath sounds.   Abdominal:      Palpations: Abdomen is soft.      Tenderness: There is abdominal tenderness.   Skin:     General: Skin is warm and dry.   Neurological:      General: No focal deficit present.      Mental Status: She is alert and oriented to person, place, and time. Mental status is at baseline.                Significant Labs: All pertinent labs within the past 24 hours have been reviewed.    Significant Imaging: I have reviewed all pertinent imaging results/findings within the past 24 hours.

## 2025-06-19 NOTE — PLAN OF CARE
Mom will continue to pump/hand express at least 8+ times/24 hrs for  baby. Symphony pump at bs. Reviewed use/cleaning. Stressed importance of hand hygiene & keeping pump kit clean. Will collect and store EBM as instructed. Will call for any needs.

## 2025-06-19 NOTE — PLAN OF CARE
Problem: Adult Inpatient Plan of Care  Goal: Plan of Care Review  Outcome: Progressing  Goal: Patient-Specific Goal (Individualized)  Outcome: Progressing  Goal: Absence of Hospital-Acquired Illness or Injury  Outcome: Progressing  Goal: Optimal Comfort and Wellbeing  Outcome: Progressing  Goal: Readiness for Transition of Care  Outcome: Progressing     Problem: Infection  Goal: Absence of Infection Signs and Symptoms  Outcome: Progressing     Problem: Fever  Goal: Body Temperature in Desired Range  Outcome: Progressing     Problem: Breastfeeding  Goal: Effective Breastfeeding  Outcome: Progressing     Problem: Sepsis/Septic Shock  Goal: Absence of Infection Signs and Symptoms  Outcome: Progressing  Goal: Optimal Nutrition Intake  Outcome: Progressing

## 2025-06-19 NOTE — PLAN OF CARE
25 1000   Rounds   Attendance Provider;Nurse ;Assigned nurse   Discharge Plan A Home with family   Why the patient remains in the hospital Requires continued medical care       1000  Patient resting quietly in bed with her mother-in-law, at the bedside when TONY participated in SIBR with Dr Calero & nurse Ashlee. Pt was admitted with chills, body aches, HA, right flank pain, & concern for retain products of conception. Pt is  10 days s/p , is being followed by OB/GYN, & is receiving IV zosyn. Temp 102.9 overnight & 101.3 at this time. Awaiting OB/GYN recs.     1510  Pt off the unit having an I&D done by Dr Dianelys Holden (OB/GYN) when CM rounded to complete the pt's discharge planning assessment.        Will continue to follow.

## 2025-06-19 NOTE — ANESTHESIA POSTPROCEDURE EVALUATION
Anesthesia Post Evaluation    Patient: Samantha Boston    Procedure(s) Performed: Procedure(s) (LRB):  DILATION AND CURETTAGE, UTERUS, USING SUCTION (N/A)    Final Anesthesia Type: general      Patient location during evaluation: PACU  Patient participation: Yes- Able to Participate  Level of consciousness: awake and alert  Post-procedure vital signs: reviewed and stable  Pain management: adequate  Airway patency: patent    PONV status at discharge: No PONV  Anesthetic complications: no      Cardiovascular status: blood pressure returned to baseline  Respiratory status: unassisted, spontaneous ventilation and room air  Hydration status: euvolemic                Vitals Value Taken Time   BP 98/56 06/19/25 15:55   Temp 36.5 °C (97.7 °F) 06/19/25 14:55   Pulse 86 06/19/25 15:56   Resp 14 06/19/25 15:56   SpO2 95 % 06/19/25 15:56   Vitals shown include unfiled device data.      No case tracking events are documented in the log.      Pain/Cesar Score: Pain Rating Prior to Med Admin: 0 (6/19/2025 11:26 AM)  Pain Rating Post Med Admin: 0 (6/19/2025 12:45 AM)  Cesar Score: 10 (6/19/2025  3:30 PM)

## 2025-06-19 NOTE — ASSESSMENT & PLAN NOTE
Anemia is likely due to acute blood loss which was from recent delivery and D&C. Most recent hemoglobin and hematocrit are listed below.  Recent Labs     06/19/25  0000 06/19/25  1332   HGB 12.8 11.4*   HCT 38.8 35.0*     Plan  - Monitor serial CBC: Daily  - Transfuse PRBC if patient becomes hemodynamically unstable, symptomatic or H/H drops below 7/21.  - Patient has not received any PRBC transfusions to date  - Patient's anemia is currently stable  - cont to monitor

## 2025-06-19 NOTE — ASSESSMENT & PLAN NOTE
Patient had recent pre eclampsia and was continued on amlodipine  Will hold for now given soft BP

## 2025-06-19 NOTE — PROGRESS NOTES
Pharmacokinetic Initial Assessment: IV Vancomycin    Assessment/Plan:    Initiate intravenous vancomycin with loading dose of 2750 mg once ( was given in ER ) followed by a maintenance dose of vancomycin 2000 mg IV every 12 hours  Desired empiric serum trough concentration is 15 to 20 mcg/mL  Draw vancomycin trough level 60 min prior to fourth dose on 6/20 at approximately 1200  Pharmacy will continue to follow and monitor vancomycin.      Please contact pharmacy at extension [phone] with any questions regarding this assessment.     Thank you for the consult,   Stephanie Chun       Patient brief summary:  Samantha Boston is a 40 y.o. female initiated on antimicrobial therapy with IV Vancomycin for treatment of suspected sepsis    Drug Allergies:   Review of patient's allergies indicates:  No Known Allergies    Actual Body Weight:   113.4 kg     Renal Function:   Estimated Creatinine Clearance: 108 mL/min (based on SCr of 0.9 mg/dL).,       CBC (last 72 hours):  Recent Labs   Lab Result Units 06/19/25  0000   WBC K/uL 4.95   HGB gm/dL 12.8   HCT % 38.8   Platelet Count K/uL 311   Lymph % % 11.7*   Mono % % 0.8*   Eos % % 0.0   Basophil % % 0.2       Metabolic Panel (last 72 hours):  Recent Labs   Lab Result Units 06/18/25  2100 06/18/25  2137 06/19/25  0000 06/19/25  0210   Sodium mmol/L  --  139 139  --    Potassium mmol/L  --   --  4.1  --    Chloride mmol/L  --  108 106  --    CO2 mmol/L  --   --  18*  --    Carbon Dioxide mmol/L  --  22*  --   --    Glucose mg/dL  --  109* 94  --    Glucose, UA  Normal  --   --  Negative   BUN mg/dL  --   --  15  --    Creatinine mg/dL  --  0.84 0.9  --    Albumin g/dL  --  3.8 4.1  --    Total Bilirubin mg/dL  --  0.4  --   --    Bilirubin Total mg/dL  --   --  0.4  --    ALP unit/L  --   --  144*  --    AST unit/L  --  17 22  --    ALT unit/L  --  16 26  --    Magnesium  mg/dL  --   --  1.8  --    Magnesium Level mg/dL  --  1.9  --   --    Phosphorus Level mg/dL  --   --  4.1   "--        Drug levels (last 3 results):  No results for input(s): "VANCOMYCINRA", "VANCORANDOM", "VANCOMYCINPE", "VANCOPEAK", "VANCOMYCINTR", "VANCOTROUGH" in the last 72 hours.    Microbiologic Results:  Microbiology Results (last 7 days)       Procedure Component Value Units Date/Time    Influenza A & B by Molecular [[id number]]  (Normal) Collected: 06/19/25 0426    Order Status: Completed Specimen: Nasal Swab Updated: 06/19/25 0449     INFLUENZA A MOLECULAR Negative     INFLUENZA B MOLECULAR  Negative    Blood culture x two cultures. Draw prior to antibiotics. [[id number]] Collected: 06/19/25 0002    Order Status: Sent Specimen: Blood from Peripheral, Hand, Right Updated: 06/19/25 0025    Blood culture x two cultures. Draw prior to antibiotics. [[id number]] Collected: 06/19/25 0000    Order Status: Sent Specimen: Blood from Peripheral, Forearm, Left Updated: 06/19/25 0025            "

## 2025-06-19 NOTE — ASSESSMENT & PLAN NOTE
"This patient does have evidence of infective focus  My overall impression is sepsis without organ dysfunction.  Source: Urinary Tract Infection and Intra-abdominal Infection  Antibiotics given-   Antibiotics (72h ago, onward)      Start     Stop Route Frequency Ordered    06/19/25 1300  vancomycin 2 g in 0.9% sodium chloride 500 mL IVPB         -- IV Every 12 hours (non-standard times) 06/19/25 1043    06/19/25 1100  piperacillin-tazobactam (ZOSYN) 4.5 g in D5W 100 mL IVPB (MB+)         -- IV Every 8 hours (non-standard times) 06/19/25 0955    06/19/25 1054  vancomycin - pharmacy to dose  (vancomycin IVPB (PEDS and ADULTS))        Placed in "And" Linked Group    -- IV pharmacy to manage frequency 06/19/25 0955          Latest lactate reviewed-  Recent Labs   Lab 06/19/25  0228   LACTATE 0.9       Will Not start Pressors- Levophed for MAP of 65    Follow urine culture  Patient have retained product of conception, jaime evaluated the patient and D&C was performed today, sent for culture  Will get renal US to evaluate for pyelonephritis given suspicion of UTI with flank pain  Cont broad spectrum antibiotics and fluid resuscitation      "

## 2025-06-19 NOTE — ANESTHESIA PROCEDURE NOTES
Intubation    Date/Time: 6/19/2025 2:12 PM    Performed by: Ros Hicks CRNA  Authorized by: ARIANNA Day MD    Intubation:     Induction:  Intravenous    Intubated:  Postinduction    Mask Ventilation:  2 handed mask ventilation with 2 providers    Attempts:  1    Attempted By:  CRNA    Difficult Airway Encountered?: No      Complications:  None    Airway Device:  Supraglottic airway/LMA    Airway Device Size:  4.0    Style/Cuff Inflation:  Cuffed (inflated to minimal occlusive pressure)    Secured at:  The lips    Placement Verified By:  Capnometry    Complicating Factors:  None    Findings Post-Intubation:  BS equal bilateral and atraumatic/condition of teeth unchanged

## 2025-06-19 NOTE — ASSESSMENT & PLAN NOTE
Patient's most recent potassium results are listed below.   Recent Labs     06/18/25  2137 06/19/25  0000 06/19/25  1332   K 3.7 4.1 3.3*     Plan  - Replete potassium per protocol  - Monitor potassium Daily  - Patient's hypokalemia is stable  - replete

## 2025-06-19 NOTE — TRANSFER OF CARE
"Anesthesia Transfer of Care Note    Patient: Samantha Boston    Procedure(s) Performed: Procedure(s) (LRB):  DILATION AND CURETTAGE, UTERUS, USING SUCTION (N/A)    Patient location: PACU    Anesthesia Type: general    Transport from OR: Transported from OR on 6-10 L/min O2 by face mask with adequate spontaneous ventilation    Post pain: adequate analgesia    Post assessment: no apparent anesthetic complications    Post vital signs: stable    Level of consciousness: responds to stimulation    Nausea/Vomiting: no nausea/vomiting    Complications: none    Transfer of care protocol was followed      Last vitals: Visit Vitals  BP (!) 102/51   Pulse 94   Temp 36.5 °C (97.7 °F) (Skin)   Resp (!) 22   Ht 5' 7" (1.702 m)   Wt 119 kg (262 lb 5.6 oz)   SpO2 95%   Breastfeeding Yes   BMI 41.09 kg/m²     "

## 2025-06-20 LAB
ABSOLUTE EOSINOPHIL (OHS): 0 K/UL
ABSOLUTE MONOCYTE (OHS): 0.28 K/UL (ref 0.3–1)
ABSOLUTE NEUTROPHIL COUNT (OHS): 7.24 K/UL (ref 1.8–7.7)
ACB COMPLEX DNA BLD POS QL NAA+NON-PROBE: NOT DETECTED
ANION GAP (OHS): 8 MMOL/L (ref 8–16)
B FRAGILIS DNA BLD POS QL NAA+PROBE: NOT DETECTED
BASOPHILS # BLD AUTO: 0.01 K/UL
BASOPHILS NFR BLD AUTO: 0.1 %
BUN SERPL-MCNC: 11 MG/DL (ref 6–20)
C ALBICANS DNA BLD POS QL NAA+PROBE: NOT DETECTED
C AURIS DNA BLD POS QL NAA+NON-PROBE: NOT DETECTED
C GATTII+NEOFOR DNA CSF QL NAA+NON-PROBE: NOT DETECTED
C GLABRATA DNA BLD POS QL NAA+PROBE: NOT DETECTED
C KRUSEI DNA BLD POS QL NAA+PROBE: NOT DETECTED
C PARAP DNA BLD POS QL NAA+PROBE: NOT DETECTED
C TROPICLS DNA BLD POS QL NAA+PROBE: NOT DETECTED
CALCIUM SERPL-MCNC: 8.3 MG/DL (ref 8.7–10.5)
CHLORIDE SERPL-SCNC: 112 MMOL/L (ref 95–110)
CO2 SERPL-SCNC: 18 MMOL/L (ref 23–29)
COLISTIN RES MCR-1 ISLT/SPM QL: NOT DETECTED
CREAT SERPL-MCNC: 0.7 MG/DL (ref 0.5–1.4)
E CLOAC COMP DNA BLD POS QL NAA+PROBE: NOT DETECTED
E COLI DNA BLD POS QL NAA+PROBE: DETECTED
E FAECALIS+OTHR E SP RRNA BLD POS FISH: NOT DETECTED
E FAECIUM HSP60 BLD POS QL PROBE: NOT DETECTED
ENTEROBACTERALES DNA BLD POS NAA+N-PRB: ABNORMAL
ERYTHROCYTE [DISTWIDTH] IN BLOOD BY AUTOMATED COUNT: 15.3 % (ref 11.5–14.5)
ESBL CFT TO CFT-CLAV IC RTO BD POS IMP: NOT DETECTED
GFR SERPLBLD CREATININE-BSD FMLA CKD-EPI: >60 ML/MIN/1.73/M2
GLUCOSE SERPL-MCNC: 101 MG/DL (ref 70–110)
GP B STREP DNA CSF QL NAA+NON-PROBE: NOT DETECTED
HAEM INFLU DNA CSF QL NAA+NON-PROBE: NOT DETECTED
HCT VFR BLD AUTO: 33.1 % (ref 37–48.5)
HGB BLD-MCNC: 10.4 GM/DL (ref 12–16)
IMM GRANULOCYTES # BLD AUTO: 0.04 K/UL (ref 0–0.04)
IMM GRANULOCYTES NFR BLD AUTO: 0.5 % (ref 0–0.5)
IMP CARBAPENEMASE ISLT QL IA.RAPID: NOT DETECTED
K OXYTOCA OMPA BLD POS QL PROBE: NOT DETECTED
KLEBSIELLA SP DNA BLD POS QL NAA+NON-PRB: NOT DETECTED
KLEBSIELLA SP DNA BLD POS QL NAA+NON-PRB: NOT DETECTED
KPC CARBAPENEMASE ISLT QL IA.RAPID: NOT DETECTED
L MONOCYTOG DNA CSF QL NAA+NON-PROBE: NOT DETECTED
LYMPHOCYTES # BLD AUTO: 0.46 K/UL (ref 1–4.8)
MAGNESIUM SERPL-MCNC: 2 MG/DL (ref 1.6–2.6)
MCH RBC QN AUTO: 27.3 PG (ref 27–31)
MCHC RBC AUTO-ENTMCNC: 31.4 G/DL (ref 32–36)
MCV RBC AUTO: 87 FL (ref 82–98)
MECA+MECC NOSE QL NAA+PROBE: ABNORMAL
MECA+MECC+MREJ ISLT/SPM QL: ABNORMAL
N MEN DNA CSF QL NAA+NON-PROBE: NOT DETECTED
NDM CARBAPENEMASE ISLT QL IA.RAPID: NOT DETECTED
NUCLEATED RBC (/100WBC) (OHS): 0 /100 WBC
OXA-48-LIKE CRBPNASE ISLT QL IA.RAPID: NOT DETECTED
P AERUGINOSA DNA BLD POS QL NAA+PROBE: NOT DETECTED
PLATELET # BLD AUTO: 236 K/UL (ref 150–450)
PMV BLD AUTO: 10 FL (ref 9.2–12.9)
POTASSIUM SERPL-SCNC: 4.2 MMOL/L (ref 3.5–5.1)
PROTEUS SP DNA BLD POS QL NAA+PROBE: NOT DETECTED
RBC # BLD AUTO: 3.81 M/UL (ref 4–5.4)
RELATIVE EOSINOPHIL (OHS): 0 %
RELATIVE LYMPHOCYTE (OHS): 5.7 % (ref 18–48)
RELATIVE MONOCYTE (OHS): 3.5 % (ref 4–15)
RELATIVE NEUTROPHIL (OHS): 90.2 % (ref 38–73)
S AUREUS DNA BLD POS QL NAA+PROBE: NOT DETECTED
S ENT+BONG DNA STL QL NAA+NON-PROBE: NOT DETECTED
S EPIDERMIDIS HSP60 BLD POS QL PROBE: NOT DETECTED
S LUGDUNENSIS SODA BLD POS QL PROBE: NOT DETECTED
S MALTOPH DNA BLD POS QL NAA+PROBE: NOT DETECTED
S MARCESCENS DNA BLD POS QL NAA+PROBE: NOT DETECTED
S PNEUM DNA CSF QL NAA+NON-PROBE: NOT DETECTED
S PYOGENES HSP60 BLD POS QL PROBE: NOT DETECTED
SODIUM SERPL-SCNC: 138 MMOL/L (ref 136–145)
STAPH SP TUF BLD POS QL PROBE: NOT DETECTED
STREPTOCOCCUS SP TUF BLD POS QL PROBE: NOT DETECTED
VAN(A+B+C1+C2) GENES ISLT/SPM: ABNORMAL
VIM CARBAPENEMASE ISLT QL IA.RAPID: NOT DETECTED
WBC # BLD AUTO: 8.03 K/UL (ref 3.9–12.7)

## 2025-06-20 PROCEDURE — 80048 BASIC METABOLIC PNL TOTAL CA: CPT | Performed by: STUDENT IN AN ORGANIZED HEALTH CARE EDUCATION/TRAINING PROGRAM

## 2025-06-20 PROCEDURE — 36415 COLL VENOUS BLD VENIPUNCTURE: CPT | Performed by: STUDENT IN AN ORGANIZED HEALTH CARE EDUCATION/TRAINING PROGRAM

## 2025-06-20 PROCEDURE — 63600175 PHARM REV CODE 636 W HCPCS: Performed by: STUDENT IN AN ORGANIZED HEALTH CARE EDUCATION/TRAINING PROGRAM

## 2025-06-20 PROCEDURE — 83735 ASSAY OF MAGNESIUM: CPT | Performed by: STUDENT IN AN ORGANIZED HEALTH CARE EDUCATION/TRAINING PROGRAM

## 2025-06-20 PROCEDURE — 11000001 HC ACUTE MED/SURG PRIVATE ROOM

## 2025-06-20 PROCEDURE — 25000003 PHARM REV CODE 250: Performed by: OBSTETRICS & GYNECOLOGY

## 2025-06-20 PROCEDURE — 85025 COMPLETE CBC W/AUTO DIFF WBC: CPT | Performed by: STUDENT IN AN ORGANIZED HEALTH CARE EDUCATION/TRAINING PROGRAM

## 2025-06-20 PROCEDURE — 25000003 PHARM REV CODE 250: Performed by: HOSPITALIST

## 2025-06-20 PROCEDURE — 63600175 PHARM REV CODE 636 W HCPCS: Performed by: HOSPITALIST

## 2025-06-20 PROCEDURE — 25000003 PHARM REV CODE 250: Performed by: STUDENT IN AN ORGANIZED HEALTH CARE EDUCATION/TRAINING PROGRAM

## 2025-06-20 RX ORDER — NIFEDIPINE 30 MG/1
30 TABLET, EXTENDED RELEASE ORAL DAILY
Status: DISCONTINUED | OUTPATIENT
Start: 2025-06-20 | End: 2025-06-22 | Stop reason: HOSPADM

## 2025-06-20 RX ORDER — ACETAMINOPHEN 325 MG/1
650 TABLET ORAL EVERY 6 HOURS PRN
Status: DISCONTINUED | OUTPATIENT
Start: 2025-06-20 | End: 2025-06-22 | Stop reason: HOSPADM

## 2025-06-20 RX ADMIN — PIPERACILLIN SODIUM AND TAZOBACTAM SODIUM 4.5 G: 4; .5 INJECTION, POWDER, LYOPHILIZED, FOR SOLUTION INTRAVENOUS at 06:06

## 2025-06-20 RX ADMIN — PIPERACILLIN SODIUM AND TAZOBACTAM SODIUM 4.5 G: 4; .5 INJECTION, POWDER, LYOPHILIZED, FOR SOLUTION INTRAVENOUS at 12:06

## 2025-06-20 RX ADMIN — METHYLERGONOVINE 0.2 MG: 0.2 TABLET ORAL at 02:06

## 2025-06-20 RX ADMIN — NIFEDIPINE 30 MG: 30 TABLET, FILM COATED, EXTENDED RELEASE ORAL at 01:06

## 2025-06-20 RX ADMIN — ACETAMINOPHEN 650 MG: 325 TABLET ORAL at 11:06

## 2025-06-20 RX ADMIN — VANCOMYCIN HYDROCHLORIDE 2000 MG: 10 INJECTION, POWDER, LYOPHILIZED, FOR SOLUTION INTRAVENOUS at 05:06

## 2025-06-20 RX ADMIN — PIPERACILLIN SODIUM AND TAZOBACTAM SODIUM 4.5 G: 4; .5 INJECTION, POWDER, LYOPHILIZED, FOR SOLUTION INTRAVENOUS at 02:06

## 2025-06-20 NOTE — PROGRESS NOTES
2025    Chief complaint: fever     HPI: 40 y.o. yo  11 days s/p , she was induced with HTN and developed pre-e with severe features so was on magnesium for 24 hours after.  Readmitted for sepsis. Had D&C yesterday for possible retained POC, vaginal bleeding is light this am. No pain or cramping. Pumping and doing well, breasts no longer engorged. No more flank pain.     History reviewed. No pertinent past medical history.  Past Surgical History:   Procedure Laterality Date    DILATION AND CURETTAGE OF UTERUS USING SUCTION N/A 2025    Procedure: DILATION AND CURETTAGE, UTERUS, USING SUCTION;  Surgeon: Dianelys Holden MD;  Location: [facility] OR;  Service: OB/GYN;  Laterality: N/A;    TONSILLECTOMY       Social History[1]  Family History   Problem Relation Name Age of Onset    Cataracts Maternal Grandmother      Glaucoma Maternal Grandmother      Hypertension Mother      Amblyopia Neg Hx      Blindness Neg Hx      Cancer Neg Hx      Macular degeneration Neg Hx      Retinal detachment Neg Hx      Strabismus Neg Hx       Review of patient's allergies indicates:  No Known Allergies      Meds: procardia 60 xl     ROS:   Negative     Temp:  [98.3 °F (36.8 °C)-98.5 °F (36.9 °C)]   Pulse:  []   Resp:  [16-20]   BP: (135-145)/(71-84)   SpO2:  [97 %-98 %]     APPEARANCE: Well nourished, well developed, in no acute distress.  NEUROLOGIC: orientated to person, place and time, normal mood and affect       ABDOMEN: Soft. No tenderness or masses. No CVA tenderness. No hernias    ADNEXA: No masses or tenderness.      S/p    Fever/sepsis    Thickened EMS     Plan:   Encouraged to pump and ok to breast feed with current antibiotics but she likely wants to save milk for later.  Suspect UTI vs pyleo as source for infection as original urine with nitrates, urine culture collected after had been on antibiotics.  However, D&C done yesterday for thickened EMS, no obvious retained placenta on D&C but discussed with  patient can't know until final pathology comes back, specimen sent to pathology, cultures done. Has longer stretch of being afebrile so hopefully will continue. Was hopeful could discharge however blood cultures positive so will need to wait for sensitivities so can transition to oral antibiotics.           [1]   Social History  Socioeconomic History    Marital status: Single   Tobacco Use    Smoking status: Never    Smokeless tobacco: Never   Substance and Sexual Activity    Alcohol use: No    Drug use: No    Sexual activity: Yes     Social Drivers of Health     Financial Resource Strain: Low Risk  (6/19/2025)    Overall Financial Resource Strain (CARDIA)     Difficulty of Paying Living Expenses: Not hard at all   Food Insecurity: No Food Insecurity (6/19/2025)    Hunger Vital Sign     Worried About Running Out of Food in the Last Year: Never true     Ran Out of Food in the Last Year: Never true   Transportation Needs: No Transportation Needs (6/19/2025)    PRAPARE - Transportation     Lack of Transportation (Medical): No     Lack of Transportation (Non-Medical): No   Physical Activity: Insufficiently Active (6/8/2025)    Received from [facility]    Exercise Vital Sign     Days of Exercise per Week: 3 days     Minutes of Exercise per Session: 20 min   Stress: Stress Concern Present (6/19/2025)    [employer] [place] [employer] - Occupational Stress Questionnaire     Feeling of Stress : Rather much   Housing Stability: Low Risk  (6/19/2025)    Housing Stability Vital Sign     Unable to Pay for Housing in the Last Year: No     Number of Times Moved in the Last Year: 0     Homeless in the Last Year: No

## 2025-06-20 NOTE — LACTATION NOTE
Secure chat message sent to Jenny RN, pt's nurse, encouraging her to contact this RN if any needs/concerns/questions about breast pumping this shift.

## 2025-06-20 NOTE — SUBJECTIVE & OBJECTIVE
Interval History:      I have seen and examined the patient today  She looks and feels much better  Has been a febrile since yesterday.    Still has vaginal bleeding but not more than the expected, denies abdominal pain.    I explained the blood culture findings and she expressed understanding.    Review of Systems   Constitutional: Negative.    HENT: Negative.     Respiratory: Negative.     Cardiovascular: Negative.    Gastrointestinal: Negative.    Genitourinary:  Positive for vaginal bleeding.   Musculoskeletal: Negative.    Skin: Negative.    Neurological: Negative.    Psychiatric/Behavioral: Negative.       Objective:     Vital Signs (Most Recent):  Temp: 98.3 °F (36.8 °C) (06/20/25 1155)  Pulse: 80 (06/20/25 1155)  Resp: 16 (06/20/25 1155)  BP: 135/80 (06/20/25 1155)  SpO2: 98 % (06/20/25 1155) Vital Signs (24h Range):  Temp:  [97.7 °F (36.5 °C)-99.1 °F (37.3 °C)] 98.3 °F (36.8 °C)  Pulse:  [] 80  Resp:  [12-30] 16  SpO2:  [92 %-98 %] 98 %  BP: ()/(53-84) 135/80     Weight: 119 kg (262 lb 5.6 oz)  Body mass index is 41.09 kg/m².    Intake/Output Summary (Last 24 hours) at 6/20/2025 1236  Last data filed at 6/19/2025 1740  Gross per 24 hour   Intake 589.74 ml   Output 200 ml   Net 389.74 ml         Physical Exam  Constitutional:       Appearance: Normal appearance.   Cardiovascular:      Rate and Rhythm: Normal rate and regular rhythm.   Pulmonary:      Effort: Pulmonary effort is normal. No respiratory distress.      Breath sounds: Normal breath sounds.   Abdominal:      Palpations: Abdomen is soft.   Skin:     General: Skin is warm and dry.   Neurological:      General: No focal deficit present.      Mental Status: She is alert and oriented to person, place, and time.               Significant Labs: All pertinent labs within the past 24 hours have been reviewed.    Significant Imaging: I have reviewed all pertinent imaging results/findings within the past 24 hours.

## 2025-06-20 NOTE — ASSESSMENT & PLAN NOTE
Patient's most recent potassium results are listed below.   Recent Labs     06/19/25  0000 06/19/25  1332 06/20/25  0230   K 4.1 3.3* 4.2     Plan  - Replete potassium per protocol  - Monitor potassium Daily  - Patient's hypokalemia is resolved

## 2025-06-20 NOTE — ASSESSMENT & PLAN NOTE
Anemia is likely due to acute blood loss which was from recent delivery and D&C. Most recent hemoglobin and hematocrit are listed below.  Recent Labs     06/19/25  0000 06/19/25  1332 06/20/25  0230   HGB 12.8 11.4* 10.4*   HCT 38.8 35.0* 33.1*     Plan  - Monitor serial CBC: Daily  - Transfuse PRBC if patient becomes hemodynamically unstable, symptomatic or H/H drops below 7/21.  - Patient has not received any PRBC transfusions to date  - Patient's anemia is currently stable  - cont to monitor

## 2025-06-20 NOTE — PLAN OF CARE
Problem: Adult Inpatient Plan of Care  Goal: Plan of Care Review  Outcome: Progressing     Problem: Infection  Goal: Absence of Infection Signs and Symptoms  Outcome: Progressing     Problem: Fever  Goal: Body Temperature in Desired Range  Outcome: Progressing     Problem: Bariatric Environmental Safety  Goal: Safety Maintained with Care  Outcome: Progressing     Problem: Breastfeeding  Goal: Effective Breastfeeding  Outcome: Progressing     Problem: Wound  Goal: Optimal Coping  Outcome: Progressing     Problem: Sepsis/Septic Shock  Goal: Optimal Coping  Outcome: Progressing   AAOX4. No c/o flank pain/discomfort this shift just body soreness. Ambulates to the bathroom w/ stand by assist. Mother is at bedside. Pt is breast pumping & baby bottles are being brought down to mother baby floor for storing. On iv abx. Pt having light/minimal vaginal bleeding this shift. Bed in lowest position. Call light within reach.

## 2025-06-20 NOTE — PROGRESS NOTES
Came to evaluate patient as a nurse called saying that bleeding had soaked onto the pad on the bed. Was the first time patient had moved in five hours since the D&C. Patient reports the actual pad did not have any blood on it. It appears that the blood had missed the pad and gone onto the pad on the bed. Had new pad on that had been there for 30 minutes no blood on the pad. Pushed on the patients uterus that felt firm and below umbilicus and no extra bleeding seen. Will check pad every hour for the next few hours. Do Weighed pad counts. Will give a dose of oral Methergine.

## 2025-06-20 NOTE — PLAN OF CARE
06/20/25 1025   Rounds   Attendance Provider;Nurse    Discharge Plan A Home with family   Why the patient remains in the hospital Requires continued medical care       1025  Patient resting quietly in bed with her mother, Jose Antonio Boston ([phone]), at the bedside when CM participated in SIBR with Dr Calero & nurse Jenny. Pt was admitted with sepsis, continues to be followed by OB/GYN, is s/p I&D done on 6/19/2025 by Dr Dianelys Holden (OB/GYN), & is receiving IV zosyn. Pt afebrile overnight.     MD informed the pt of (+) blood cultures & pending sensitivities. Pt verbalized understanding.     Plan to discharge pt home with family when medically stable.    CM updated patient's whiteboard with CM name & contact information.        [facility] - Med Surg  Initial Discharge Assessment       Primary Care Provider: Sandor Funez MD    Admission Diagnosis: Screening for cardiovascular condition [Z13.6]  Tachycardia [R00.0]  Urinary tract infection without hematuria, site unspecified [N39.0]  Sepsis, due to unspecified organism, unspecified whether acute organ dysfunction present [A41.9]    Admission Date: 6/18/2025  Expected Discharge Date: 6/20/2025    Consult: OB/GYN    Payor: AETNA / Plan: AETNA CHOICE POS / Product Type: Commercial /     Extended Emergency Contact Information  Primary Emergency Contact: Jose Antonio Boston   [facility]  Home Phone: [phone]  Relation: Mother    Discharge Plan A: (P) Home with family  Discharge Plan B: (P) Home Health      [id number] - [place], [place] - [address] [facility] [address]  [address] [facility] [address]  Phone: [phone] Fax: [phone]      Initial Assessment (most recent)       Adult Discharge Assessment - 06/20/25 1040          Discharge Assessment    Assessment Type Discharge Planning Assessment (P)      Confirmed/corrected address, phone number and insurance Yes (P)      Confirmed  Demographics Correct on Facesheet (P)      Source of Information patient (P)      People in Home spouse;child(brandyn), dependent (P)    spouse & 2 dep children (10 days & 5 y.o.)    Do you expect to return to your current living situation? Yes (P)      Do you have help at home or someone to help you manage your care at home? Yes (P)      Prior to hospitilization cognitive status: Alert/Oriented (P)      Current cognitive status: Alert/Oriented (P)      Equipment Currently Used at Home blood pressure machine (P)      Readmission within 30 days? No (P)      Patient currently being followed by outpatient case management? No (P)      Do you take prescription medications? Yes (P)      Do you have prescription coverage? Yes (P)      Do you have any problems affording any of your prescribed medications? No (P)      Is the patient taking medications as prescribed? yes (P)      How do you get to doctors appointments? car, drives self (P)      Are you on dialysis? No (P)      Do you take coumadin? No (P)      Discharge Plan A Home with family (P)      Discharge Plan B Home Health (P)      DME Needed Upon Discharge  none (P)      Discharge Plan discussed with: Patient (P)         Physical Activity    On average, how many days per week do you engage in moderate to strenuous exercise (like a brisk walk)? 0 days (P)      On average, how many minutes do you engage in exercise at this level? 0 min (P)         Financial Resource Strain    How hard is it for you to pay for the very basics like food, housing, medical care, and heating? Not hard at all (P)         Housing Stability    In the last 12 months, was there a time when you were not able to pay the mortgage or rent on time? No (P)      At any time in the past 12 months, were you homeless or living in a shelter (including now)? No (P)         Transportation Needs    In the past 12 months, has lack of transportation kept you from medical appointments or from getting medications? No  (P)      In the past 12 months, has lack of transportation kept you from meetings, work, or from getting things needed for daily living? No (P)         Food Insecurity    Within the past 12 months, you worried that your food would run out before you got the money to buy more. Never true (P)      Within the past 12 months, the food you bought just didn't last and you didn't have money to get more. Never true (P)         Stress    Do you feel stress - tense, restless, nervous, or anxious, or unable to sleep at night because your mind is troubled all the time - these days? Not at all (P)         Social Isolation    How often do you feel lonely or isolated from those around you?  Never (P)         Alcohol Use    Q1: How often do you have a drink containing alcohol? Never (P)      Q2: How many drinks containing alcohol do you have on a typical day when you are drinking? Patient does not drink (P)      Q3: How often do you have six or more drinks on one occasion? Never (P)         Nativo    In the past 12 months has the electric, gas, oil, or water company threatened to shut off services in your home? No (P)         Health Literacy    How often do you need to have someone help you when you read instructions, pamphlets, or other written material from your doctor or pharmacy? Rarely (P)    Pt wears glasses                   1040  Patient resting quietly in bed with her mother, Jose Antonio Boston ([phone]), at the bedside when CM rounded.     Patient lives with her spouse, & 2 dep children (10 days & 5 y.o.), is independent of all ADLs, & denied the need for assistance with transportation at time of discharge.     Pt stated that Dr April Vogt is her OB/GYN at [facility] & Dr Madi Hudson is her PCP at [facility] and that she does not have a scheduled appt with either.     1435  [facility] appts scheduled for the pt with Dr April Vogt (OB/GYN) on 6/24/2025 at 0845 & Dr Josee Hudson (PCP) on 6/25/2025 at 1245. Information added to  the pt's discharge paperwork.       Will continue to follow.

## 2025-06-20 NOTE — LACTATION NOTE
2320 - Contacted pt's nurse via secure chat and advised her to call or use secure chat for any patient needs r/t breast pumping/lactation.

## 2025-06-20 NOTE — ASSESSMENT & PLAN NOTE
Patient has Abnormal Magnesium: hypomagnesemia. Will continue to monitor electrolytes closely. Will replace the affected electrolytes and repeat labs to be done after interventions completed. The patient's magnesium results have been reviewed and are listed below.  Recent Labs   Lab 06/20/25  0230   MG 2.0   resolved

## 2025-06-20 NOTE — PROGRESS NOTES
[facility] Medicine  Progress Note    Patient Name: Samantha Boston  MRN: [id number]  Patient Class: IP- Inpatient   Admission Date: 2025  Length of Stay: 1 days  Attending Physician: Bk Calero MD  Primary Care Provider: Sandor Funez MD        Subjective     Principal Problem:Sepsis        HPI:  41 yo G , 10 days post partum which was complicated by pre eclampsia, patient presented with fever and not feeling well  Patient states she was feeling okay until suddenly at 5 am yesterday, she had sudden onset chills, she was shivering and not feeling well, then developed generalized body aches mostly on the flanks with repeated episodes of shivering , and noticed that she started to have high fever so decided to come to the ED. She is still having vaginal bleeding but not heavy and she noticed possible mal odorous vaginal discharge as well, she denies dysuria or frequency, she was breast feeding with no issues.      Overview/Hospital Course:  No notes on file    Interval History:      I have seen and examined the patient today  She looks and feels much better  Has been a febrile since yesterday.    Still has vaginal bleeding but not more than the expected, denies abdominal pain.    I explained the blood culture findings and she expressed understanding.    Review of Systems   Constitutional: Negative.    HENT: Negative.     Respiratory: Negative.     Cardiovascular: Negative.    Gastrointestinal: Negative.    Genitourinary:  Positive for vaginal bleeding.   Musculoskeletal: Negative.    Skin: Negative.    Neurological: Negative.    Psychiatric/Behavioral: Negative.       Objective:     Vital Signs (Most Recent):  Temp: 98.3 °F (36.8 °C) (25 1155)  Pulse: 80 (25 1155)  Resp: 16 (25 1155)  BP: 135/80 (25 1155)  SpO2: 98 % (25 1155) Vital Signs (24h Range):  Temp:  [97.7 °F (36.5 °C)-99.1 °F (37.3 °C)] 98.3 °F (36.8 °C)  Pulse:  [] 80  Resp:  [12-30]  16  SpO2:  [92 %-98 %] 98 %  BP: ()/(53-84) 135/80     Weight: 119 kg (262 lb 5.6 oz)  Body mass index is 41.09 kg/m².    Intake/Output Summary (Last 24 hours) at 6/20/2025 1236  Last data filed at 6/19/2025 1740  Gross per 24 hour   Intake 589.74 ml   Output 200 ml   Net 389.74 ml         Physical Exam  Constitutional:       Appearance: Normal appearance.   Cardiovascular:      Rate and Rhythm: Normal rate and regular rhythm.   Pulmonary:      Effort: Pulmonary effort is normal. No respiratory distress.      Breath sounds: Normal breath sounds.   Abdominal:      Palpations: Abdomen is soft.   Skin:     General: Skin is warm and dry.   Neurological:      General: No focal deficit present.      Mental Status: She is alert and oriented to person, place, and time.               Significant Labs: All pertinent labs within the past 24 hours have been reviewed.    Significant Imaging: I have reviewed all pertinent imaging results/findings within the past 24 hours.      Assessment & Plan  Sepsis  This patient does have evidence of infective focus  My overall impression is sepsis without organ dysfunction.  Source: Urinary Tract Infection and Intra-abdominal Infection  Antibiotics given-   Antibiotics (72h ago, onward)      Start     Stop Route Frequency Ordered    06/19/25 1100  piperacillin-tazobactam (ZOSYN) 4.5 g in D5W 100 mL IVPB (MB+)         -- IV Every 8 hours (non-standard times) 06/19/25 0955          Latest lactate reviewed-  Recent Labs   Lab 06/19/25  0228   LACTATE 0.9       Will Not start Pressors- Levophed for MAP of 65    Follow urine culture  -blood culture is positive for E coli, follow sensitivity   Patient have retained product of conception, obalexne evaluated the patient and D&C was performed 06/19, sent for culture  renal US with no sign of pyelonephritis  DC IV fluid, encourage oral hydration      Acute blood loss anemia  Anemia is likely due to acute blood loss which was from recent delivery  and D&C. Most recent hemoglobin and hematocrit are listed below.  Recent Labs     06/19/25  0000 06/19/25  1332 06/20/25  0230   HGB 12.8 11.4* 10.4*   HCT 38.8 35.0* 33.1*     Plan  - Monitor serial CBC: Daily  - Transfuse PRBC if patient becomes hemodynamically unstable, symptomatic or H/H drops below 7/21.  - Patient has not received any PRBC transfusions to date  - Patient's anemia is currently stable  - cont to monitor  Hypokalemia  Patient's most recent potassium results are listed below.   Recent Labs     06/19/25  0000 06/19/25  1332 06/20/25  0230   K 4.1 3.3* 4.2     Plan  - Replete potassium per protocol  - Monitor potassium Daily  - Patient's hypokalemia is resolved    Hypomagnesemia  Patient has Abnormal Magnesium: hypomagnesemia. Will continue to monitor electrolytes closely. Will replace the affected electrolytes and repeat labs to be done after interventions completed. The patient's magnesium results have been reviewed and are listed below.  Recent Labs   Lab 06/20/25  0230   MG 2.0   resolved  Pre-eclampsia in third trimester  Patient had recent pre eclampsia and was continued on Nifedipine  Resume home meds.    VTE Risk Mitigation (From admission, onward)           Ordered     IP VTE HIGH RISK PATIENT  Once         06/19/25 0952     Place sequential compression device  Until discontinued         06/19/25 0952                    Discharge Planning   ROD: 6/20/2025     Code Status: Full Code   Medical Readiness for Discharge Date:   Discharge Plan A: Home with family                        Bk Calero MD  Department of Hospital Medicine   [facility]

## 2025-06-20 NOTE — ASSESSMENT & PLAN NOTE
This patient does have evidence of infective focus  My overall impression is sepsis without organ dysfunction.  Source: Urinary Tract Infection and Intra-abdominal Infection  Antibiotics given-   Antibiotics (72h ago, onward)      Start     Stop Route Frequency Ordered    06/19/25 1100  piperacillin-tazobactam (ZOSYN) 4.5 g in D5W 100 mL IVPB (MB+)         -- IV Every 8 hours (non-standard times) 06/19/25 0955          Latest lactate reviewed-  Recent Labs   Lab 06/19/25  0228   LACTATE 0.9       Will Not start Pressors- Levophed for MAP of 65    Follow urine culture  -blood culture is positive for E coli, follow sensitivity   Patient have retained product of conception, obgyne evaluated the patient and D&C was performed 06/19, sent for culture  renal US with no sign of pyelonephritis  DC IV fluid, encourage oral hydration

## 2025-06-21 PROBLEM — E87.6 HYPOKALEMIA: Status: RESOLVED | Noted: 2025-06-19 | Resolved: 2025-06-21

## 2025-06-21 PROBLEM — E83.42 HYPOMAGNESEMIA: Status: RESOLVED | Noted: 2025-06-19 | Resolved: 2025-06-21

## 2025-06-21 LAB
ABSOLUTE EOSINOPHIL (OHS): 0.02 K/UL
ABSOLUTE MONOCYTE (OHS): 0.68 K/UL (ref 0.3–1)
ABSOLUTE NEUTROPHIL COUNT (OHS): 4.47 K/UL (ref 1.8–7.7)
ANION GAP (OHS): 8 MMOL/L (ref 8–16)
BACTERIA UR CULT: NO GROWTH
BASOPHILS # BLD AUTO: 0.03 K/UL
BASOPHILS NFR BLD AUTO: 0.5 %
BUN SERPL-MCNC: 12 MG/DL (ref 6–20)
CALCIUM SERPL-MCNC: 8.7 MG/DL (ref 8.7–10.5)
CHLORIDE SERPL-SCNC: 111 MMOL/L (ref 95–110)
CO2 SERPL-SCNC: 23 MMOL/L (ref 23–29)
CREAT SERPL-MCNC: 1 MG/DL (ref 0.5–1.4)
ERYTHROCYTE [DISTWIDTH] IN BLOOD BY AUTOMATED COUNT: 15 % (ref 11.5–14.5)
GFR SERPLBLD CREATININE-BSD FMLA CKD-EPI: >60 ML/MIN/1.73/M2
GLUCOSE SERPL-MCNC: 96 MG/DL (ref 70–110)
HCT VFR BLD AUTO: 34.4 % (ref 37–48.5)
HGB BLD-MCNC: 11 GM/DL (ref 12–16)
IMM GRANULOCYTES # BLD AUTO: 0.05 K/UL (ref 0–0.04)
IMM GRANULOCYTES NFR BLD AUTO: 0.8 % (ref 0–0.5)
LYMPHOCYTES # BLD AUTO: 1.34 K/UL (ref 1–4.8)
MAGNESIUM SERPL-MCNC: 2 MG/DL (ref 1.6–2.6)
MCH RBC QN AUTO: 27.6 PG (ref 27–31)
MCHC RBC AUTO-ENTMCNC: 32 G/DL (ref 32–36)
MCV RBC AUTO: 86 FL (ref 82–98)
NUCLEATED RBC (/100WBC) (OHS): 0 /100 WBC
PLATELET # BLD AUTO: 301 K/UL (ref 150–450)
PMV BLD AUTO: 9.6 FL (ref 9.2–12.9)
POTASSIUM SERPL-SCNC: 3.7 MMOL/L (ref 3.5–5.1)
RBC # BLD AUTO: 3.98 M/UL (ref 4–5.4)
RELATIVE EOSINOPHIL (OHS): 0.3 %
RELATIVE LYMPHOCYTE (OHS): 20.3 % (ref 18–48)
RELATIVE MONOCYTE (OHS): 10.3 % (ref 4–15)
RELATIVE NEUTROPHIL (OHS): 67.8 % (ref 38–73)
SODIUM SERPL-SCNC: 142 MMOL/L (ref 136–145)
WBC # BLD AUTO: 6.59 K/UL (ref 3.9–12.7)

## 2025-06-21 PROCEDURE — 25000003 PHARM REV CODE 250: Performed by: STUDENT IN AN ORGANIZED HEALTH CARE EDUCATION/TRAINING PROGRAM

## 2025-06-21 PROCEDURE — 21400001 HC TELEMETRY ROOM

## 2025-06-21 PROCEDURE — 63600175 PHARM REV CODE 636 W HCPCS: Performed by: STUDENT IN AN ORGANIZED HEALTH CARE EDUCATION/TRAINING PROGRAM

## 2025-06-21 PROCEDURE — 80048 BASIC METABOLIC PNL TOTAL CA: CPT | Performed by: STUDENT IN AN ORGANIZED HEALTH CARE EDUCATION/TRAINING PROGRAM

## 2025-06-21 PROCEDURE — 85025 COMPLETE CBC W/AUTO DIFF WBC: CPT | Performed by: STUDENT IN AN ORGANIZED HEALTH CARE EDUCATION/TRAINING PROGRAM

## 2025-06-21 PROCEDURE — 36415 COLL VENOUS BLD VENIPUNCTURE: CPT | Performed by: STUDENT IN AN ORGANIZED HEALTH CARE EDUCATION/TRAINING PROGRAM

## 2025-06-21 PROCEDURE — 83735 ASSAY OF MAGNESIUM: CPT | Performed by: STUDENT IN AN ORGANIZED HEALTH CARE EDUCATION/TRAINING PROGRAM

## 2025-06-21 RX ADMIN — PIPERACILLIN SODIUM AND TAZOBACTAM SODIUM 4.5 G: 4; .5 INJECTION, POWDER, LYOPHILIZED, FOR SOLUTION INTRAVENOUS at 06:06

## 2025-06-21 RX ADMIN — NIFEDIPINE 30 MG: 30 TABLET, FILM COATED, EXTENDED RELEASE ORAL at 08:06

## 2025-06-21 RX ADMIN — PIPERACILLIN SODIUM AND TAZOBACTAM SODIUM 4.5 G: 4; .5 INJECTION, POWDER, LYOPHILIZED, FOR SOLUTION INTRAVENOUS at 11:06

## 2025-06-21 RX ADMIN — ACETAMINOPHEN 650 MG: 325 TABLET ORAL at 08:06

## 2025-06-21 RX ADMIN — PIPERACILLIN SODIUM AND TAZOBACTAM SODIUM 4.5 G: 4; .5 INJECTION, POWDER, LYOPHILIZED, FOR SOLUTION INTRAVENOUS at 02:06

## 2025-06-21 NOTE — ASSESSMENT & PLAN NOTE
This patient does have evidence of infective focus  My overall impression is sepsis without organ dysfunction.  Source: Urinary Tract Infection and Intra-abdominal Infection  Antibiotics given-   Antibiotics (72h ago, onward)      Start     Stop Route Frequency Ordered    06/19/25 1100  piperacillin-tazobactam (ZOSYN) 4.5 g in D5W 100 mL IVPB (MB+)         -- IV Every 8 hours (non-standard times) 06/19/25 0955          Latest lactate reviewed-  Recent Labs   Lab 06/19/25  0228   LACTATE 0.9         Follow urine culture  -blood culture is positive for E coli, follow sensitivity   Patient have retained product of conception, obgyne evaluated the patient and D&C was performed 06/19, sent for culture , growing nawaf gram negative  renal US with no sign of pyelonephritis

## 2025-06-21 NOTE — ASSESSMENT & PLAN NOTE
Patient's most recent potassium results are listed below.   Recent Labs     06/19/25  1332 06/20/25  0230 06/21/25  0407   K 3.3* 4.2 3.7     Plan  - Replete potassium per protocol  - Monitor potassium Daily  - Patient's hypokalemia is resolved

## 2025-06-21 NOTE — LACTATION NOTE
8163 Follow up of exclusively BF mom who is pumping s/p D&C/sepsis by phone.Mom states she has everything she needs. Mom states she has washed her pump parts and is about to pump. Mom states her pump parts haven't been sterilized yet. Mom states she does have a microwavable sterilizer bag.   Instr mom that her pump parts need to be washed with soap and water every 24 hours and then, sterilized once every 24 hours or once a day. Instr mom  on good handwashing prior to each pumping session. Mom states she is about to pump.  Instr mom to go ahead and pump, wash her pump parts with soap and water again, rinse well and then, this RN would come up to pt's room and sterilized pump parts. Mom denies having any questions or concerns. Instr mom to call lact ctr for any questions or concerns. Mom states good understanding of all instr. Praise and encouragement given.    1100 Mom states she did has sepsis and had a D&C. Mom states that the lactation consultant printed out information on pt's medications and pt's Doctor reviewed and approved BF. Mom inquires about BF and sepsis. Instr mom that it sounds like her Doctor looked at all that along with her medications yesterday, however, she may ask her doctor when her doctor comes in next time. Instr mom that with sepsis, it can affect mom's milk supply. Mom states she is pumping q 3 hours during the day and tries to pump at least one or two times during the night.  Instr mom the recommendation to keep up milk supply is to BF or pump frequently, drink water, eat well and rest.  Instr mom to pump every 3 hours during the daytime and at least one time at night and possibly when she gets up for the bathroom. Instr mom to drink 8, 8oz glasses of water every day and whenever she feels thirsty. Instr mom to eat well and rest as all that is important for her to get well and keep up and maintain a good milk supply. Mom states that she feels like the Medela pump is pulling better than her  pump at home. Mom states she has a hands free Motif pump at home. Mom states she got that from her insurance company. Mom states she isn't part of the WIC program but does have [employer] and Medicaid. Mom given information sheet with DME companies from which she may obtain another BR pump.  Instr mom she may, also, want to contact her local WIC office to inquire about qualifying for WIC and obtaining an electric BR pump. [employer] handout given. Pump parts sterilized. Instr mom that [facility] isn't open on Sundays and instr mom to make sure to wash her pump parts tomorrow with soap and water, rinse and let the staff know to assist with sterilizing her pump parts. Instr mom that the instr for the use of microwavable bag are on the back side of the bag with instr on how to sterilize BR pump parts. Mom had 2 bottles of EBM obtained.  Both bottles were labeled, dated and timed, taken to Nursery on Mother-Baby Unit and placed in freezer per mom's request. Mom denies having any questions or concerns. Instr mom to call for any questions or concerns. Mom states good understanding of all instr. Praise and encouragement given.

## 2025-06-21 NOTE — PLAN OF CARE
Problem: Adult Inpatient Plan of Care  Goal: Plan of Care Review  Outcome: Progressing  Goal: Patient-Specific Goal (Individualized)  Outcome: Progressing  Goal: Absence of Hospital-Acquired Illness or Injury  Outcome: Progressing  Goal: Optimal Comfort and Wellbeing  Outcome: Progressing  Goal: Readiness for Transition of Care  Outcome: Progressing     Problem: Infection  Goal: Absence of Infection Signs and Symptoms  Outcome: Progressing     Problem: Fever  Goal: Body Temperature in Desired Range  Outcome: Progressing

## 2025-06-21 NOTE — SUBJECTIVE & OBJECTIVE
Interval History:      I have seen and examined the patient today    She is doing well  Her  is at bedside and had questions about her condition, I reassured him and answered all his questions.    I explained that we are waiting on culture results.    Review of Systems   Constitutional: Negative.    HENT: Negative.     Respiratory: Negative.     Cardiovascular: Negative.    Gastrointestinal: Negative.    Genitourinary:  Positive for vaginal bleeding.   Musculoskeletal: Negative.    Skin: Negative.    Neurological: Negative.    Psychiatric/Behavioral: Negative.       Objective:     Vital Signs (Most Recent):  Temp: 98.2 °F (36.8 °C) (06/21/25 1121)  Pulse: 86 (06/21/25 1208)  Resp: 16 (06/21/25 1121)  BP: 131/85 (06/21/25 1121)  SpO2: 97 % (06/21/25 1121) Vital Signs (24h Range):  Temp:  [98 °F (36.7 °C)-99.1 °F (37.3 °C)] 98.2 °F (36.8 °C)  Pulse:  [67-86] 86  Resp:  [16-20] 16  SpO2:  [94 %-99 %] 97 %  BP: (131-156)/(85-92) 131/85     Weight: 119 kg (262 lb 5.6 oz)  Body mass index is 41.09 kg/m².    Intake/Output Summary (Last 24 hours) at 6/21/2025 1246  Last data filed at 6/20/2025 1730  Gross per 24 hour   Intake 220 ml   Output 1 ml   Net 219 ml         Physical Exam  Constitutional:       Appearance: Normal appearance.   Cardiovascular:      Rate and Rhythm: Normal rate and regular rhythm.   Pulmonary:      Effort: Pulmonary effort is normal. No respiratory distress.      Breath sounds: Normal breath sounds.   Abdominal:      Palpations: Abdomen is soft.   Skin:     General: Skin is warm and dry.   Neurological:      General: No focal deficit present.      Mental Status: She is alert and oriented to person, place, and time.               Significant Labs: All pertinent labs within the past 24 hours have been reviewed.    Significant Imaging: I have reviewed all pertinent imaging results/findings within the past 24 hours.

## 2025-06-21 NOTE — ASSESSMENT & PLAN NOTE
Anemia is likely due to acute blood loss which was from recent delivery and D&C. Most recent hemoglobin and hematocrit are listed below.  Recent Labs     06/19/25  1332 06/20/25  0230 06/21/25  0407   HGB 11.4* 10.4* 11.0*   HCT 35.0* 33.1* 34.4*     Plan  - Monitor serial CBC: Daily  - Transfuse PRBC if patient becomes hemodynamically unstable, symptomatic or H/H drops below 7/21.  - Patient has not received any PRBC transfusions to date  - Patient's anemia is currently stable  - cont to monitor

## 2025-06-21 NOTE — PROGRESS NOTES
[facility] Medicine  Progress Note    Patient Name: Samantha Boston  MRN: [id number]  Patient Class: IP- Inpatient   Admission Date: 2025  Length of Stay: 2 days  Attending Physician: Bk Calero MD  Primary Care Provider: Josee Hudson MD        Subjective     Principal Problem:Sepsis        HPI:  41 yo G , 10 days post partum which was complicated by pre eclampsia, patient presented with fever and not feeling well  Patient states she was feeling okay until suddenly at 5 am yesterday, she had sudden onset chills, she was shivering and not feeling well, then developed generalized body aches mostly on the flanks with repeated episodes of shivering , and noticed that she started to have high fever so decided to come to the ED. She is still having vaginal bleeding but not heavy and she noticed possible mal odorous vaginal discharge as well, she denies dysuria or frequency, she was breast feeding with no issues.      Overview/Hospital Course:  No notes on file    Interval History:      I have seen and examined the patient today    She is doing well  Her  is at bedside and had questions about her condition, I reassured him and answered all his questions.    I explained that we are waiting on culture results.    Review of Systems   Constitutional: Negative.    HENT: Negative.     Respiratory: Negative.     Cardiovascular: Negative.    Gastrointestinal: Negative.    Genitourinary:  Positive for vaginal bleeding.   Musculoskeletal: Negative.    Skin: Negative.    Neurological: Negative.    Psychiatric/Behavioral: Negative.       Objective:     Vital Signs (Most Recent):  Temp: 98.2 °F (36.8 °C) (25 1121)  Pulse: 86 (25 1208)  Resp: 16 (25 1121)  BP: 131/85 (25 1121)  SpO2: 97 % (25 1121) Vital Signs (24h Range):  Temp:  [98 °F (36.7 °C)-99.1 °F (37.3 °C)] 98.2 °F (36.8 °C)  Pulse:  [67-86] 86  Resp:  [16-20] 16  SpO2:  [94 %-99 %] 97 %  BP:  (131-156)/(85-92) 131/85     Weight: 119 kg (262 lb 5.6 oz)  Body mass index is 41.09 kg/m².    Intake/Output Summary (Last 24 hours) at 6/21/2025 1246  Last data filed at 6/20/2025 1730  Gross per 24 hour   Intake 220 ml   Output 1 ml   Net 219 ml         Physical Exam  Constitutional:       Appearance: Normal appearance.   Cardiovascular:      Rate and Rhythm: Normal rate and regular rhythm.   Pulmonary:      Effort: Pulmonary effort is normal. No respiratory distress.      Breath sounds: Normal breath sounds.   Abdominal:      Palpations: Abdomen is soft.   Skin:     General: Skin is warm and dry.   Neurological:      General: No focal deficit present.      Mental Status: She is alert and oriented to person, place, and time.               Significant Labs: All pertinent labs within the past 24 hours have been reviewed.    Significant Imaging: I have reviewed all pertinent imaging results/findings within the past 24 hours.      Assessment & Plan  Sepsis  This patient does have evidence of infective focus  My overall impression is sepsis without organ dysfunction.  Source: Urinary Tract Infection and Intra-abdominal Infection  Antibiotics given-   Antibiotics (72h ago, onward)      Start     Stop Route Frequency Ordered    06/19/25 1100  piperacillin-tazobactam (ZOSYN) 4.5 g in D5W 100 mL IVPB (MB+)         -- IV Every 8 hours (non-standard times) 06/19/25 0955          Latest lactate reviewed-  Recent Labs   Lab 06/19/25  0228   LACTATE 0.9         Follow urine culture  -blood culture is positive for E coli, follow sensitivity   Patient have retained product of conception, obgyne evaluated the patient and D&C was performed 06/19, sent for culture , growing naawf gram negative  renal US with no sign of pyelonephritis        Acute blood loss anemia  Anemia is likely due to acute blood loss which was from recent delivery and D&C. Most recent hemoglobin and hematocrit are listed below.  Recent Labs     06/19/25  1332  06/20/25  0230 06/21/25  0407   HGB 11.4* 10.4* 11.0*   HCT 35.0* 33.1* 34.4*     Plan  - Monitor serial CBC: Daily  - Transfuse PRBC if patient becomes hemodynamically unstable, symptomatic or H/H drops below 7/21.  - Patient has not received any PRBC transfusions to date  - Patient's anemia is currently stable  - cont to monitor  Hypokalemia (Resolved: 6/21/2025)  Patient's most recent potassium results are listed below.   Recent Labs     06/19/25  1332 06/20/25  0230 06/21/25  0407   K 3.3* 4.2 3.7     Plan  - Replete potassium per protocol  - Monitor potassium Daily  - Patient's hypokalemia is resolved    Pre-eclampsia in third trimester  Patient had recent pre eclampsia and was continued on Nifedipine  Resume home meds.    VTE Risk Mitigation (From admission, onward)           Ordered     IP VTE HIGH RISK PATIENT  Once         06/19/25 0952     Place sequential compression device  Until discontinued         06/19/25 0952                    Discharge Planning   ROD: 6/22/2025     Code Status: Full Code   Medical Readiness for Discharge Date:   Discharge Plan A: Home with family                        Bk Calero MD  Department of Hospital Medicine   [facility]

## 2025-06-22 ENCOUNTER — NURSE TRIAGE (OUTPATIENT)
Dept: ADMINISTRATIVE | Facility: CLINIC | Age: 40
End: 2025-06-22
Payer: COMMERCIAL

## 2025-06-22 VITALS
TEMPERATURE: 98 F | HEIGHT: 67 IN | RESPIRATION RATE: 16 BRPM | BODY MASS INDEX: 41.18 KG/M2 | WEIGHT: 262.38 LBS | OXYGEN SATURATION: 99 % | DIASTOLIC BLOOD PRESSURE: 88 MMHG | SYSTOLIC BLOOD PRESSURE: 122 MMHG | HEART RATE: 79 BPM

## 2025-06-22 LAB
ABSOLUTE EOSINOPHIL (OHS): 0.04 K/UL
ABSOLUTE MONOCYTE (OHS): 0.57 K/UL (ref 0.3–1)
ABSOLUTE NEUTROPHIL COUNT (OHS): 2.27 K/UL (ref 1.8–7.7)
ANION GAP (OHS): 9 MMOL/L (ref 8–16)
BACTERIA BLD CULT: ABNORMAL
BASOPHILS # BLD AUTO: 0.04 K/UL
BASOPHILS NFR BLD AUTO: 0.8 %
BUN SERPL-MCNC: 15 MG/DL (ref 6–20)
CALCIUM SERPL-MCNC: 9 MG/DL (ref 8.7–10.5)
CHLORIDE SERPL-SCNC: 108 MMOL/L (ref 95–110)
CO2 SERPL-SCNC: 26 MMOL/L (ref 23–29)
CREAT SERPL-MCNC: 1 MG/DL (ref 0.5–1.4)
ERYTHROCYTE [DISTWIDTH] IN BLOOD BY AUTOMATED COUNT: 15.2 % (ref 11.5–14.5)
GFR SERPLBLD CREATININE-BSD FMLA CKD-EPI: >60 ML/MIN/1.73/M2
GLUCOSE SERPL-MCNC: 91 MG/DL (ref 70–110)
GRAM STN SPEC: ABNORMAL
HCT VFR BLD AUTO: 37.2 % (ref 37–48.5)
HGB BLD-MCNC: 11.8 GM/DL (ref 12–16)
IMM GRANULOCYTES # BLD AUTO: 0.08 K/UL (ref 0–0.04)
IMM GRANULOCYTES NFR BLD AUTO: 1.6 % (ref 0–0.5)
LYMPHOCYTES # BLD AUTO: 1.9 K/UL (ref 1–4.8)
MAGNESIUM SERPL-MCNC: 2.1 MG/DL (ref 1.6–2.6)
MCH RBC QN AUTO: 27.6 PG (ref 27–31)
MCHC RBC AUTO-ENTMCNC: 31.7 G/DL (ref 32–36)
MCV RBC AUTO: 87 FL (ref 82–98)
NUCLEATED RBC (/100WBC) (OHS): 0 /100 WBC
PLATELET # BLD AUTO: 343 K/UL (ref 150–450)
PMV BLD AUTO: 9.7 FL (ref 9.2–12.9)
POTASSIUM SERPL-SCNC: 3.4 MMOL/L (ref 3.5–5.1)
RBC # BLD AUTO: 4.27 M/UL (ref 4–5.4)
RELATIVE EOSINOPHIL (OHS): 0.8 %
RELATIVE LYMPHOCYTE (OHS): 38.8 % (ref 18–48)
RELATIVE MONOCYTE (OHS): 11.6 % (ref 4–15)
RELATIVE NEUTROPHIL (OHS): 46.4 % (ref 38–73)
SODIUM SERPL-SCNC: 143 MMOL/L (ref 136–145)
WBC # BLD AUTO: 4.9 K/UL (ref 3.9–12.7)

## 2025-06-22 PROCEDURE — 36415 COLL VENOUS BLD VENIPUNCTURE: CPT | Performed by: STUDENT IN AN ORGANIZED HEALTH CARE EDUCATION/TRAINING PROGRAM

## 2025-06-22 PROCEDURE — 63600175 PHARM REV CODE 636 W HCPCS: Performed by: STUDENT IN AN ORGANIZED HEALTH CARE EDUCATION/TRAINING PROGRAM

## 2025-06-22 PROCEDURE — 51798 US URINE CAPACITY MEASURE: CPT

## 2025-06-22 PROCEDURE — 85025 COMPLETE CBC W/AUTO DIFF WBC: CPT | Performed by: STUDENT IN AN ORGANIZED HEALTH CARE EDUCATION/TRAINING PROGRAM

## 2025-06-22 PROCEDURE — 80048 BASIC METABOLIC PNL TOTAL CA: CPT | Performed by: STUDENT IN AN ORGANIZED HEALTH CARE EDUCATION/TRAINING PROGRAM

## 2025-06-22 PROCEDURE — 83735 ASSAY OF MAGNESIUM: CPT | Performed by: STUDENT IN AN ORGANIZED HEALTH CARE EDUCATION/TRAINING PROGRAM

## 2025-06-22 PROCEDURE — 51701 INSERT BLADDER CATHETER: CPT

## 2025-06-22 PROCEDURE — 25000003 PHARM REV CODE 250: Performed by: STUDENT IN AN ORGANIZED HEALTH CARE EDUCATION/TRAINING PROGRAM

## 2025-06-22 RX ORDER — AMOXICILLIN AND CLAVULANATE POTASSIUM 875; 125 MG/1; MG/1
1 TABLET, FILM COATED ORAL EVERY 12 HOURS
Qty: 22 TABLET | Refills: 0 | Status: SHIPPED | OUTPATIENT
Start: 2025-06-22 | End: 2025-07-03

## 2025-06-22 RX ORDER — POTASSIUM CHLORIDE 20 MEQ/1
40 TABLET, EXTENDED RELEASE ORAL ONCE
Status: COMPLETED | OUTPATIENT
Start: 2025-06-22 | End: 2025-06-22

## 2025-06-22 RX ADMIN — PIPERACILLIN SODIUM AND TAZOBACTAM SODIUM 4.5 G: 4; .5 INJECTION, POWDER, LYOPHILIZED, FOR SOLUTION INTRAVENOUS at 11:06

## 2025-06-22 RX ADMIN — ACETAMINOPHEN 650 MG: 325 TABLET ORAL at 10:06

## 2025-06-22 RX ADMIN — POTASSIUM CHLORIDE 40 MEQ: 1500 TABLET, EXTENDED RELEASE ORAL at 09:06

## 2025-06-22 RX ADMIN — PIPERACILLIN SODIUM AND TAZOBACTAM SODIUM 4.5 G: 4; .5 INJECTION, POWDER, LYOPHILIZED, FOR SOLUTION INTRAVENOUS at 03:06

## 2025-06-22 RX ADMIN — NIFEDIPINE 30 MG: 30 TABLET, FILM COATED, EXTENDED RELEASE ORAL at 07:06

## 2025-06-22 NOTE — PLAN OF CARE
Pt will dc with no needs noted. Pts family will provide transport home. No additional cm needs.     Cleared from CM . Bedside Nurse and VN notified.     06/22/25 9317   Final Note   Assessment Type Final Discharge Note   Anticipated Discharge Disposition Home   Hospital Resources/Appts/Education Provided Appointments scheduled and added to AVS   Post-Acute Status   Discharge Delays None known at this time

## 2025-06-22 NOTE — ASSESSMENT & PLAN NOTE
"This patient does have evidence of infective focus  My overall impression is sepsis without organ dysfunction.  Source: Urinary Tract Infection and Intra-abdominal Infection  Antibiotics given-   Antibiotics (72h ago, onward)      Start     Stop Route Frequency Ordered    06/19/25 1100  piperacillin-tazobactam (ZOSYN) 4.5 g in D5W 100 mL IVPB (MB+)         -- IV Every 8 hours (non-standard times) 06/19/25 0955          Latest lactate reviewed-  No results for input(s): "LACTATE", "POCLAC" in the last 72 hours.    E coli in blood culture and in the product of conception removed.  Patient will be discharged to complete total 14 days of antibiotics         "

## 2025-06-22 NOTE — PROGRESS NOTES
Discharge orders noted. Additional clinical references attached. Patient's discharge instructions given by bedside RN and reviewed via this VN.  Education provided on new and previous medications, diagnosis, and follow-up appointments.  New medications sent to patient's pharmacy. Patient verbalized understanding and teach back method was used. Patient's ride/transportation home at bedside. All questions answered. Transport to [facility] will be requested. Floor nurse notified.            06/22/25 1340    Notification    Notified Of Discharge Status   Admission   Communication Issues? None   Shift   Virtual Nurse - Rounding Complete   Virtual Nurse - Patient Verbalized Approval Of Camera Use   Safety/Activity   Patient Rounds bed in low position;call light in patient/parent reach;clutter free environment maintained;visualized patient   Safety Promotion/Fall Prevention family to remain at bedside;side rails raised x 2   Positioning   Body Position sitting up in bed

## 2025-06-22 NOTE — PLAN OF CARE
Problem: Adult Inpatient Plan of Care  Goal: Plan of Care Review  Outcome: Met  Goal: Patient-Specific Goal (Individualized)  Outcome: Met  Goal: Absence of Hospital-Acquired Illness or Injury  Outcome: Met  Goal: Optimal Comfort and Wellbeing  Outcome: Met  Goal: Readiness for Transition of Care  Outcome: Met     Problem: Infection  Goal: Absence of Infection Signs and Symptoms  Outcome: Met     Problem: Fever  Goal: Body Temperature in Desired Range  Outcome: Met     Problem: Bariatric Environmental Safety  Goal: Safety Maintained with Care  Outcome: Met     Problem: Breastfeeding  Goal: Effective Breastfeeding  Outcome: Met     Problem: Wound  Goal: Optimal Coping  Outcome: Met  Goal: Optimal Functional Ability  Outcome: Met  Goal: Absence of Infection Signs and Symptoms  Outcome: Met  Goal: Improved Oral Intake  Outcome: Met  Goal: Optimal Pain Control and Function  Outcome: Met  Goal: Skin Health and Integrity  Outcome: Met  Goal: Optimal Wound Healing  Outcome: Met     Problem: Sepsis/Septic Shock  Goal: Optimal Coping  Outcome: Met  Goal: Absence of Bleeding  Outcome: Met  Goal: Blood Glucose Level Within Targeted Range  Outcome: Met  Goal: Absence of Infection Signs and Symptoms  Outcome: Met  Goal: Optimal Nutrition Intake  Outcome: Met     Problem: Fall Injury Risk  Goal: Absence of Fall and Fall-Related Injury  Outcome: Met

## 2025-06-22 NOTE — ASSESSMENT & PLAN NOTE
Anemia is likely due to acute blood loss which was from recent delivery and D&C. Most recent hemoglobin and hematocrit are listed below.  Recent Labs     06/20/25  0230 06/21/25  0407 06/22/25  0506   HGB 10.4* 11.0* 11.8*   HCT 33.1* 34.4* 37.2     Plan  stable

## 2025-06-22 NOTE — NURSING
"Message sent to pharmacy in regards to adjusting Vancomycin due times. Patient was off the unit in surgery. Medication unable to be administered until patient returned at 1642. Per Nathan Malave, PharmD, "Please keep Vancomycin schedule. Labs and levels associated with dosing. Pharmacy will adjust accordingly."  "
.  Rapid Response Nurse Follow-up Note     Followed up with patient for proactive rounding; pt in recovery at time of rounds; chart review completed  No acute issues at this time.   Team will continue to follow.  Please call Rapid Response RN, Awa Hamm RN with any questions or concerns at [phone]     
Approximately 100mL of patents breast milk brought to mother/baby unit and given to nurse lee. Nurse stated she would place milk in freezer per patient request.   
Cannot weight pads due to them not being saturated enough     
Oral temperature 101.2 post PRN Tylenol administration. MD Bk, notified. Will administer PRN ibuprofen.   
Oral temperature 101.5. PRN Tylenol administered. MD Bk, notified and at bedside. Will continue to monitor.  
Oral temperature now 98.4 after PRN Ibuprofen administration and ice packs placed under patients axillae of both extremities.   
Patient had a moderate amount of bright red drainage from vagina. Dianelys Holden MD, notified and aware. MD to come to bedside to assess patient.   
RAPID RESPONSE NURSE PROACTIVE ROUNDING NOTE       Admit Date: 2025  LOS: 0  Code Status: Full Code   Date of Visit: 2025  : 1985  Age: 40 y.o.  Sex: female  Race: Black or   Bed: K534/K534 A:   MRN: [id number]  Was the patient discharged from an ICU this admission? No   Was the patient discharged from a PACU within last 24 hours? No   Did the patient receive conscious sedation/general anesthesia in last 24 hours? No   Was the patient in the ED within the past 24 hours? No   Was the patient on NIPPV within the past 24 hours? No   Attending Physician: Ariel Wu MD  Primary Service: Hospitalist,Family Medicine     Diagnosis: <principal problem not specified>   has no past medical history on file.    Last Vitals:  Temp: 98.3 °F (36.8 °C) (851)  Pulse: 101 (851)  Resp: 19 (851)  BP: 137/68 (851)  SpO2: 100 % (851)    24 Hour Vitals Range:  Temp:  [98.3 °F (36.8 °C)-102.9 °F (39.4 °C)]   Pulse:  [101-168]   Resp:  [18-33]   BP: (118-137)/(60-72)   SpO2:  [97 %-100 %]       ASSESSMENT/INTERVENTIONS    -chart review completed: vitals, labs, and notes reviewed  -upon assessment pt is oriented x4 shivering in bed; she states that she has full body aches and was feeling better before she was taken off abx to be transferred here  -temp 98.3, tachy in the low 100s  -most recent lactic 0.9    RECOMMENDATIONS    -abx, monitor temp     Discussed plan of care with bedside RNAshlee    FOLLOW UP    Call back the Rapid Response NurseAwa at [phone] for additional questions or concerns.           
Rapid Response Nurse Follow-up Note     Followed up with patient for proactive rounding. VSS. Pt without any complaints during visit. IVFs infusing 100ml/hr on RA.  No acute issues at this time. Reviewed plan of care with Bedside nurse, Malika.   Team will continue to follow.  Please call Rapid Response RN, Vale Benjamin RN with any questions or concerns at N Phone [phone].      
Detail Level: Zone

## 2025-06-22 NOTE — DISCHARGE SUMMARY
[facility] Medicine  Discharge Summary      Patient Name: Samantha Boston  MRN: [id number]  FAY: [id number]  Patient Class: IP- Inpatient  Admission Date: 2025  Hospital Length of Stay: 3 days  Discharge Date and Time: 2025 1:39 PM  Attending Physician: Bk Calero MD   Discharging Provider: Bk Calero MD  Primary Care Provider: Josee Hudson MD    Primary Care Team: [facility]INE PHYSICIAN TEAM 2    HPI:   39 yo G , 10 days post partum which was complicated by pre eclampsia, patient presented with fever and not feeling well  Patient states she was feeling okay until suddenly at 5 am yesterday, she had sudden onset chills, she was shivering and not feeling well, then developed generalized body aches mostly on the flanks with repeated episodes of shivering , and noticed that she started to have high fever so decided to come to the ED. She is still having vaginal bleeding but not heavy and she noticed possible mal odorous vaginal discharge as well, she denies dysuria or frequency, she was breast feeding with no issues.      Procedure(s) (LRB):  DILATION AND CURETTAGE, UTERUS, USING SUCTION (N/A)      Hospital Course:   Patient was admitted for fever and found to have sepsis secondary to E coli bacteremia, pelvic US showed retained product of conception and ob took the patient to OR for D&C.  Patient has been on IV abcs for 3 days now, she is afebrile for 48 hours, feels much better.  She will switched to Augmentin to finish 14 days course of abcs     Goals of Care Treatment Preferences:  Code Status: Full Code         Consults:   Consults (From admission, onward)          Status Ordering Provider     Inpatient consult to Obstetrics  Once        Provider:  (Not yet assigned)    Acknowledged BK CALERO            Assessment & Plan  Sepsis  This patient does have evidence of infective focus  My overall impression is sepsis without organ  "dysfunction.  Source: Urinary Tract Infection and Intra-abdominal Infection  Antibiotics given-   Antibiotics (72h ago, onward)      Start     Stop Route Frequency Ordered    06/19/25 1100  piperacillin-tazobactam (ZOSYN) 4.5 g in D5W 100 mL IVPB (MB+)         -- IV Every 8 hours (non-standard times) 06/19/25 0955          Latest lactate reviewed-  No results for input(s): "LACTATE", "POCLAC" in the last 72 hours.    E coli in blood culture and in the product of conception removed.  Patient will be discharged to complete total 14 days of antibiotics         Acute blood loss anemia  Anemia is likely due to acute blood loss which was from recent delivery and D&C. Most recent hemoglobin and hematocrit are listed below.  Recent Labs     06/20/25  0230 06/21/25  0407 06/22/25  0506   HGB 10.4* 11.0* 11.8*   HCT 33.1* 34.4* 37.2     Plan  stable  Pre-eclampsia in third trimester  Patient had recent pre eclampsia and was continued on Nifedipine  Resume home meds.    Final Active Diagnoses:    Diagnosis Date Noted POA    PRINCIPAL PROBLEM:  Sepsis [A41.9] 06/19/2025 Yes    Acute blood loss anemia [D62] 06/19/2025 Yes    Pre-eclampsia in third trimester [O14.93] 06/19/2025 Yes      Problems Resolved During this Admission:    Diagnosis Date Noted Date Resolved POA    Hypokalemia [E87.6] 06/19/2025 06/21/2025 No    Hypomagnesemia [E83.42] 06/19/2025 06/21/2025 Yes       Discharged Condition: good    Disposition: Home or Self Care    Follow Up:   Follow-up Information       April Vogt MD. Schedule an appointment as soon as possible for a visit on 6/24/2025.    Specialty: Obstetrics  Why: at 8:45 AM; OB/GYN hospital follow up appointment  Contact information:  [address] [address]  [phone]               Josee Hudson MD. Schedule an appointment as soon as possible for a visit on 6/25/2025.    Specialty: Family Medicine  Why: at 12:45 pm; PCP hospital follow up appointment  Contact " information:  [address]  [address]  [facility] [address]  [phone]                           Patient Instructions:   No discharge procedures on file.    Significant Diagnostic Studies: Labs: BMP:   Recent Labs   Lab 06/21/25  0407 06/22/25  0506   GLU 96 91    143   K 3.7 3.4*   * 108   CO2 23 26   BUN 12 15   CREATININE 1.0 1.0   CALCIUM 8.7 9.0   MG 2.0 2.1    and CBC   Recent Labs   Lab 06/21/25  0407 06/22/25  0506   WBC 6.59 4.90   HGB 11.0* 11.8*   HCT 34.4* 37.2    343       Pending Diagnostic Studies:       None           Medications:  Reconciled Home Medications:      Medication List        START taking these medications      amoxicillin-clavulanate 875-125mg 875-125 mg per tablet  Commonly known as: AUGMENTIN  Take 1 tablet by mouth every 12 (twelve) hours. for 11 days            CONTINUE taking these medications      NIFEdipine 30 MG (OSM) 24 hr tablet  Commonly known as: PROCARDIA-XL            STOP taking these medications      amLODIPine 5 MG tablet  Commonly known as: NORVASC     blood pressure test kit-large Kit     MONONESSA (28) 0.25-0.035 mg per tablet  Generic drug: norgestimate-ethinyl estradioL     olmesartan-hydrochlorothiazide 20-12.5 mg per tablet  Commonly known as: BENICAR HCT              Indwelling Lines/Drains at time of discharge:   Lines/Drains/Airways       None                       Time spent on the discharge of patient: 35 minutes         Bk Calero MD  Department of Hospital Medicine  [facility]

## 2025-06-22 NOTE — HOSPITAL COURSE
Patient was admitted for fever and found to have sepsis secondary to E coli bacteremia, pelvic US showed retained product of conception and ob took the patient to OR for D&C.  Patient has been on IV abcs for 3 days now, she is afebrile for 48 hours, feels much better.  She will switched to Augmentin to finish 14 days course of abcs

## 2025-06-22 NOTE — PLAN OF CARE
Problem: Breastfeeding  Goal: Effective Breastfeeding  Outcome: Progressing     Problem: Sepsis/Septic Shock  Goal: Optimal Coping  Outcome: Progressing     Problem: Fall Injury Risk  Goal: Absence of Fall and Fall-Related Injury  Outcome: Progressing

## 2025-06-23 LAB
BACTERIA SPEC AEROBE CULT: ABNORMAL
BACTERIA SPEC AEROBE CULT: ABNORMAL
ESTROGEN SERPL-MCNC: NORMAL PG/ML
INSULIN SERPL-ACNC: NORMAL U[IU]/ML
LAB AP CLINICAL INFORMATION: NORMAL
LAB AP GROSS DESCRIPTION: NORMAL
LAB AP PERFORMING LOCATION(S): NORMAL
LAB AP REPORT FOOTNOTES: NORMAL

## 2025-06-23 RX ORDER — NIFEDIPINE 30 MG/1
30 TABLET, EXTENDED RELEASE ORAL DAILY
Qty: 30 TABLET | Refills: 1 | Status: SHIPPED | OUTPATIENT
Start: 2025-06-23 | End: 2026-06-23

## 2025-06-23 NOTE — TELEPHONE ENCOUNTER
I sent the 30 xl however she has an OB at [facility]. We saw her in the hospital but my understanding is she was follow up with her ob there. Did she reach out to them.  If not she should also  to reach out to them to let them know what is going on too.

## 2025-06-23 NOTE — TELEPHONE ENCOUNTER
Patient is calling with concerns that she did not get the Procardia XL 30mg sent in to the pharmacy with her antibiotics.  She states she was last on Procardia 60mg and doesn't have any 30mg.   Patient is requesting medication is ordered at [facility]  [phone] .  Reason for Disposition   [1] Caller has NON-URGENT medicine question about med that PCP prescribed AND [2] triager unable to answer question    Additional Information   Negative: Drug overdose and triager unable to answer question   Negative: Caller requesting a renewal or refill of a medicine patient is currently taking   Negative: Caller requesting information unrelated to medicine   Negative: Caller requesting information about COVID-19 Vaccine   Negative: Caller requesting information about Emergency Contraception   Negative: Caller requesting information about Combined Birth Control Pills   Negative: Caller requesting information about Progestin Birth Control Pills   Negative: Caller requesting information about post-op pain   Negative: Caller requesting a prescription antibiotic (such as Penicillin) for Strep throat and has a positive culture result   Negative: Caller requesting a prescription anti-viral med (such as Paxlovid) and has COVID-19 symptoms   Negative: Caller requesting a prescription anti-viral med (such as Tamiflu) and has influenza (flu) symptoms   Negative: Immunization reaction suspected   Negative: Rash while taking a medicine or within 3 days of stopping it   Negative: [1] Asthma and [2] having symptoms of asthma (e.g., cough, wheezing)   Negative: [1] Symptom of illness (e.g., headache, abdominal pain, earache, vomiting) AND [2] more than mild   Negative: Breastfeeding questions about mother's medicines and diet   Negative: MORE THAN A DOUBLE DOSE of a prescription or over-the-counter (OTC) drug   Negative: [1] DOUBLE DOSE (an extra dose or lesser amount) of prescription drug AND [2] any symptoms (e.g., dizziness,  nausea, pain, sleepiness)   Negative: [1] DOUBLE DOSE (an extra dose or lesser amount) of over-the-counter (OTC) drug AND [2] any symptoms (e.g., dizziness, nausea, pain, sleepiness)   Negative: Took another person's prescription drug   Negative: [1] DOUBLE DOSE (an extra dose or lesser amount) of prescription drug AND [2] NO symptoms  (Exception: A double dose of antibiotics.)   Negative: Diabetes drug error or overdose (e.g., took wrong type of insulin or took extra dose)   Negative: [1] Prescription not at pharmacy AND [2] was prescribed by doctor (or NP/PA) recently  (Exception: Triager has access to EMR and prescription is recorded there. Go to Home Care and confirm for pharmacy.)   Negative: [1] Pharmacy calling with prescription question AND [2] triager unable to answer question   Negative: [1] Caller has URGENT medicine question about med that primary care doctor (or NP/PA) or specialist prescribed AND [2] triager unable to answer question    Protocols used: Medication Question Call-A-AH

## 2025-06-24 ENCOUNTER — TELEPHONE (OUTPATIENT)
Dept: OBSTETRICS AND GYNECOLOGY | Facility: CLINIC | Age: 40
End: 2025-06-24
Payer: COMMERCIAL

## 2025-06-24 ENCOUNTER — RESULTS FOLLOW-UP (OUTPATIENT)
Dept: OBSTETRICS AND GYNECOLOGY | Facility: HOSPITAL | Age: 40
End: 2025-06-24

## 2025-06-24 LAB — BACTERIA SPEC ANAEROBE CULT: NORMAL

## 2025-07-02 ENCOUNTER — TELEPHONE (OUTPATIENT)
Dept: OBSTETRICS AND GYNECOLOGY | Facility: CLINIC | Age: 40
End: 2025-07-02
Payer: COMMERCIAL

## 2025-07-02 NOTE — TELEPHONE ENCOUNTER
Called patient and she stated that she wants to transfer her care and has an appointment scheduled later this month.    She also stated that she is following up with her GYN provider for her D&C.     I informed patient to contact us if she had any concerns before her scheduled appointment with Dr Umm Holden.

## 2025-07-23 ENCOUNTER — OFFICE VISIT (OUTPATIENT)
Dept: OPTOMETRY | Facility: CLINIC | Age: 40
End: 2025-07-23
Payer: COMMERCIAL

## 2025-07-23 DIAGNOSIS — H52.13 MYOPIA OF BOTH EYES: Primary | ICD-10-CM

## 2025-07-23 PROCEDURE — 92015 DETERMINE REFRACTIVE STATE: CPT | Mod: S$GLB,,, | Performed by: OPTOMETRIST

## 2025-07-23 PROCEDURE — 92004 COMPRE OPH EXAM NEW PT 1/>: CPT | Mod: S$GLB,,, | Performed by: OPTOMETRIST

## 2025-07-23 PROCEDURE — 99999 PR PBB SHADOW E&M-EST. PATIENT-LVL II: CPT | Mod: PBBFAC,,, | Performed by: OPTOMETRIST

## 2025-07-23 NOTE — PROGRESS NOTES
HPI    GINO: 4/14/2021  Chief complaint (CC): patient here for routine ocular health check  States she is having no issues at this time  Glasses? +  Contacts? - no longer wearing them, they do not work for her   H/o eye surgery, injections or laser: -  H/o eye injury: -  Known eye conditions? -  Family h/o eye conditions? -  Eye gtts? -      (-) Flashes (-)  Floaters (-) Mucous   (-)  Tearing (-) Itching (-) Burning   (-) Headaches (-) Eye Pain/discomfort (-) Irritation   (-)  Redness (-) Double vision (-) Blurry vision    Diabetic? -  A1c? % HEMOGLOBIN A1C       Date                     Value               Ref Range             Status                08/20/2024               5.8 (H)             <5.7 % of tota*       Final                Last edited by Franco Coy on 7/23/2025  1:33 PM.            Assessment /Plan     For exam results, see Encounter Report.    Myopia of both eyes      Wrote new spex Rx.  Discussed impending presby  Pt gave up on CLs      PLAN:    Rtc 1 yr

## 2025-07-25 ENCOUNTER — OFFICE VISIT (OUTPATIENT)
Dept: OBSTETRICS AND GYNECOLOGY | Facility: CLINIC | Age: 40
End: 2025-07-25
Payer: COMMERCIAL

## 2025-07-25 VITALS
SYSTOLIC BLOOD PRESSURE: 130 MMHG | WEIGHT: 246.94 LBS | DIASTOLIC BLOOD PRESSURE: 79 MMHG | BODY MASS INDEX: 38.76 KG/M2 | HEIGHT: 67 IN

## 2025-07-25 DIAGNOSIS — Z30.9 ENCOUNTER FOR CONTRACEPTIVE MANAGEMENT, UNSPECIFIED TYPE: Primary | ICD-10-CM

## 2025-07-25 DIAGNOSIS — F41.9 ANXIETY: ICD-10-CM

## 2025-07-25 PROCEDURE — 99999 PR PBB SHADOW E&M-EST. PATIENT-LVL III: CPT | Mod: PBBFAC,,, | Performed by: OBSTETRICS & GYNECOLOGY

## 2025-07-25 RX ORDER — NORETHINDRONE 0.35 MG/1
1 TABLET ORAL DAILY
Qty: 84 TABLET | Refills: 3 | Status: SHIPPED | OUTPATIENT
Start: 2025-07-25 | End: 2026-07-25

## 2025-07-25 NOTE — PROGRESS NOTES
Chief Complaint   Patient presents with    Post-op Evaluation       HPI:   Samantha Boston 40 y.o.  is here for f/u hospital admit and post partum. She had a routine  complicated by readmit 7 days later with e.coli sepsis, presumed to be 2/2 urosepsis though was some e. Coli on vaginal swab as well. D&C was done. She is healing well physically feels more back to normal. No VB/VD. Is breast feeding well with no breast issues. No signs of pre-e.  H/o HTN, on medications before pregnancy, doing well on procardia now. Mentally is having anxiety around the situation. Was prescribed zoloft but didn't start it yet. Not doing therapy. Has supportive family. Sleeping well. No SI/HI.      No LMP recorded.     History reviewed. No pertinent past medical history.    Past Surgical History:   Procedure Laterality Date    DILATION AND CURETTAGE OF UTERUS USING SUCTION N/A 2025    Procedure: DILATION AND CURETTAGE, UTERUS, USING SUCTION;  Surgeon: Dianelys Holden MD;  Location: [facility];  Service: OB/GYN;  Laterality: N/A;    TONSILLECTOMY         Family History   Problem Relation Name Age of Onset    Cataracts Maternal Grandmother      Glaucoma Maternal Grandmother      Hypertension Mother      Amblyopia Neg Hx      Blindness Neg Hx      Cancer Neg Hx      Macular degeneration Neg Hx      Retinal detachment Neg Hx      Strabismus Neg Hx         Social History[1]    OB History    No obstetric history on file.          COMPREHENSIVE GYN HISTORY:  PAP History: Denies abnormal Paps.  NILm/HPV-  Infection History: Denies STDs. Denies PID.  Benign History: Denies uterine fibroids. Denies ovarian cysts. Denies endometriosis.   Cancer History: Denies cervical cancer. Denies uterine cancer or hyperplasia. Denies ovarian cancer. Denies vulvar cancer or pre-cancer. Denies vaginal cancer or pre-cancer. Denies breast cancer. Denies colon cancer.  Sexual Activity History:   reports being sexually active.   Menstrual History:  "nl/monthly (sometimes irregular especially when not exercising)       ROS:    All other ROS negative     PE:   /79   Ht 5' 7" (1.702 m)   Wt 112 kg (246 lb 14.6 oz)   BMI 38.67 kg/m²     APPEARANCE: Well nourished, well developed, in no acute distress.  masses. No hernias. No hepatosplenomegaly noted.   PELVIC:   EXTERNAL GENITALIA/VULVA: No lesions. Normal female genitalia.  URETHRAL MEATUS: Normal size and location, no lesions, no prolapse.  URETHRA: No masses, tenderness, prolapse or scarring.  BLADDER: non-tender, no masses  VAGINA: Moist and well rugated, no discharge, no significant cystocele or rectocele.  CERVIX: No lesions and discharge.  UTERUS: normal size, regular shape, mobile, non-tender, bladder base nontender.  ADNEXA: No masses or tenderness.  PERINEUM: normal in appearance, no external hemorrhoids         1. Encounter for contraceptive management, unspecified type    2. Anxiety    3. Post partum depression    4.  (spontaneous vaginal delivery)        Plan:  Healing well from delivery, ok to return to normal activities   Contraception discussed and would like to do mini pill, reviewed how to take it. Is considering one more child but unsure now so not ready for permeant birth control.   Discussed post partum depression with patient. She denies SI/HI. We discussed the first line treatment would be therapy with/without a SSRI. Will send psych referral. She would prefer to try therapy first. Will reach out if would like to do SSRI.                 [1]   Social History  Socioeconomic History    Marital status: Single   Tobacco Use    Smoking status: Never    Smokeless tobacco: Never   Substance and Sexual Activity    Alcohol use: No    Drug use: No    Sexual activity: Yes     Social Drivers of Health     Financial Resource Strain: Low Risk  (2025)    Overall Financial Resource Strain (CARDIA)     Difficulty of Paying Living Expenses: Not hard at all   Food Insecurity: No Food Insecurity " (6/20/2025)    Hunger Vital Sign     Worried About Running Out of Food in the Last Year: Never true     Ran Out of Food in the Last Year: Never true   Transportation Needs: No Transportation Needs (6/20/2025)    PRAPARE - Transportation     Lack of Transportation (Medical): No     Lack of Transportation (Non-Medical): No   Physical Activity: Inactive (6/20/2025)    Exercise Vital Sign     Days of Exercise per Week: 0 days     Minutes of Exercise per Session: 0 min   Stress: No Stress Concern Present (6/20/2025)    Citizen of [place] of Occupational [employer] - Occupational Stress Questionnaire     Feeling of Stress : Not at all   Recent Concern: Stress - Stress Concern Present (6/19/2025)    Citizen of [place] of Occup[employer] - Occupational Stress Questionnaire     Feeling of Stress : Rather much   Housing Stability: Low Risk  (6/20/2025)    Housing Stability Vital Sign     Unable to Pay for Housing in the Last Year: No     Number of Times Moved in the Last Year: 0     Homeless in the Last Year: No

## (undated) DEVICE — Device

## (undated) DEVICE — PANTIES FEMININE NAPKIN LG/XLG

## (undated) DEVICE — CATH URETHRAL RED 16FR

## (undated) DEVICE — FILTER DISPOSABLE

## (undated) DEVICE — GOWN POLY REINF BRTH SLV LG

## (undated) DEVICE — SAFE TOUCH COLLECTION SYS

## (undated) DEVICE — CONTAINERS 32OZ

## (undated) DEVICE — DRAPE SURGICAL STERI IRRG PCH

## (undated) DEVICE — SUPPORT ULNA NERVE PROTECTOR

## (undated) DEVICE — VACURETTE 10MM CURVED

## (undated) DEVICE — COVER OVERHEAD SURG LT BLUE

## (undated) DEVICE — PAD PREP CUFFED NS 24X48IN

## (undated) DEVICE — TOWEL OR XRAY BLUE 17X26IN

## (undated) DEVICE — SEE MEDLINE ITEM 154981

## (undated) DEVICE — HANDLE CURETTE W/TUBING

## (undated) DEVICE — DRAPE UINDERBUT GRAD PCH

## (undated) DEVICE — DRESSING TELFA N ADH 3X8

## (undated) DEVICE — PACK SURGERY START